# Patient Record
Sex: FEMALE | Race: ASIAN | NOT HISPANIC OR LATINO | URBAN - METROPOLITAN AREA
[De-identification: names, ages, dates, MRNs, and addresses within clinical notes are randomized per-mention and may not be internally consistent; named-entity substitution may affect disease eponyms.]

---

## 2018-03-17 ENCOUNTER — OUTPATIENT (OUTPATIENT)
Dept: OUTPATIENT SERVICES | Facility: HOSPITAL | Age: 28
LOS: 1 days | End: 2018-03-17
Payer: MEDICAID

## 2018-03-17 DIAGNOSIS — O26.899 OTHER SPECIFIED PREGNANCY RELATED CONDITIONS, UNSPECIFIED TRIMESTER: ICD-10-CM

## 2018-03-17 DIAGNOSIS — Z3A.00 WEEKS OF GESTATION OF PREGNANCY NOT SPECIFIED: ICD-10-CM

## 2018-03-17 LAB — GLUCOSE BLDC GLUCOMTR-MCNC: 137 MG/DL — HIGH (ref 70–99)

## 2018-03-17 PROCEDURE — 99282 EMERGENCY DEPT VISIT SF MDM: CPT

## 2018-03-17 PROCEDURE — 99234 HOSP IP/OBS SM DT SF/LOW 45: CPT

## 2018-03-17 PROCEDURE — G0463: CPT

## 2018-03-17 PROCEDURE — 76815 OB US LIMITED FETUS(S): CPT | Mod: 26

## 2018-03-17 PROCEDURE — 82962 GLUCOSE BLOOD TEST: CPT

## 2018-03-17 PROCEDURE — 59025 FETAL NON-STRESS TEST: CPT

## 2018-03-17 PROCEDURE — 76819 FETAL BIOPHYS PROFIL W/O NST: CPT | Mod: 26

## 2018-03-17 PROCEDURE — 76819 FETAL BIOPHYS PROFIL W/O NST: CPT

## 2018-03-17 PROCEDURE — 76815 OB US LIMITED FETUS(S): CPT

## 2018-03-17 RX ORDER — SODIUM CHLORIDE 0.65 %
1 AEROSOL, SPRAY (ML) NASAL THREE TIMES A DAY
Refills: 0 | Status: DISCONTINUED | OUTPATIENT
Start: 2018-03-17 | End: 2018-04-01

## 2018-04-15 ENCOUNTER — EMERGENCY (EMERGENCY)
Facility: HOSPITAL | Age: 28
LOS: 0 days | Discharge: HOME | End: 2018-04-15
Attending: EMERGENCY MEDICINE
Payer: COMMERCIAL

## 2018-04-15 VITALS
RESPIRATION RATE: 17 BRPM | SYSTOLIC BLOOD PRESSURE: 126 MMHG | DIASTOLIC BLOOD PRESSURE: 78 MMHG | TEMPERATURE: 99 F | HEART RATE: 74 BPM | OXYGEN SATURATION: 100 %

## 2018-04-15 VITALS
OXYGEN SATURATION: 98 % | TEMPERATURE: 98 F | SYSTOLIC BLOOD PRESSURE: 131 MMHG | DIASTOLIC BLOOD PRESSURE: 75 MMHG | WEIGHT: 203.93 LBS | HEART RATE: 79 BPM | RESPIRATION RATE: 16 BRPM | HEIGHT: 64 IN

## 2018-04-15 DIAGNOSIS — G43.009 MIGRAINE WITHOUT AURA, NOT INTRACTABLE, WITHOUT STATUS MIGRAINOSUS: ICD-10-CM

## 2018-04-15 DIAGNOSIS — R10.9 UNSPECIFIED ABDOMINAL PAIN: ICD-10-CM

## 2018-04-15 DIAGNOSIS — Z79.82 LONG TERM (CURRENT) USE OF ASPIRIN: ICD-10-CM

## 2018-04-15 LAB
ALBUMIN SERPL ELPH-MCNC: 3.3 G/DL — SIGNIFICANT CHANGE UP (ref 3.3–5)
ALP SERPL-CCNC: 109 U/L — SIGNIFICANT CHANGE UP (ref 40–120)
ALT FLD-CCNC: 63 U/L — SIGNIFICANT CHANGE UP (ref 12–78)
ANION GAP SERPL CALC-SCNC: 7 MMOL/L — SIGNIFICANT CHANGE UP (ref 5–17)
AST SERPL-CCNC: 29 U/L — SIGNIFICANT CHANGE UP (ref 15–37)
BILIRUB SERPL-MCNC: 0.2 MG/DL — SIGNIFICANT CHANGE UP (ref 0.2–1.2)
BUN SERPL-MCNC: 15 MG/DL — SIGNIFICANT CHANGE UP (ref 7–23)
CALCIUM SERPL-MCNC: 8.9 MG/DL — SIGNIFICANT CHANGE UP (ref 8.5–10.1)
CHLORIDE SERPL-SCNC: 110 MMOL/L — HIGH (ref 96–108)
CO2 SERPL-SCNC: 26 MMOL/L — SIGNIFICANT CHANGE UP (ref 22–31)
CREAT SERPL-MCNC: 0.71 MG/DL — SIGNIFICANT CHANGE UP (ref 0.5–1.3)
ETHANOL SERPL-MCNC: <10 MG/DL — SIGNIFICANT CHANGE UP (ref 0–10)
GLUCOSE SERPL-MCNC: 97 MG/DL — SIGNIFICANT CHANGE UP (ref 70–99)
HCG SERPL-ACNC: 1 MIU/ML — SIGNIFICANT CHANGE UP
HCT VFR BLD CALC: 38.6 % — SIGNIFICANT CHANGE UP (ref 34.5–45)
HGB BLD-MCNC: 11.7 G/DL — SIGNIFICANT CHANGE UP (ref 11.5–15.5)
MCHC RBC-ENTMCNC: 23.4 PG — LOW (ref 27–34)
MCHC RBC-ENTMCNC: 30.3 GM/DL — LOW (ref 32–36)
MCV RBC AUTO: 77.2 FL — LOW (ref 80–100)
NRBC # BLD: 0 /100 WBCS — SIGNIFICANT CHANGE UP (ref 0–0)
PLATELET # BLD AUTO: 238 K/UL — SIGNIFICANT CHANGE UP (ref 150–400)
POTASSIUM SERPL-MCNC: 4.1 MMOL/L — SIGNIFICANT CHANGE UP (ref 3.5–5.3)
POTASSIUM SERPL-SCNC: 4.1 MMOL/L — SIGNIFICANT CHANGE UP (ref 3.5–5.3)
PROT SERPL-MCNC: 7.6 GM/DL — SIGNIFICANT CHANGE UP (ref 6–8.3)
RBC # BLD: 5 M/UL — SIGNIFICANT CHANGE UP (ref 3.8–5.2)
RBC # FLD: 15.8 % — HIGH (ref 10.3–14.5)
SODIUM SERPL-SCNC: 143 MMOL/L — SIGNIFICANT CHANGE UP (ref 135–145)
WBC # BLD: 8.82 K/UL — SIGNIFICANT CHANGE UP (ref 3.8–10.5)
WBC # FLD AUTO: 8.82 K/UL — SIGNIFICANT CHANGE UP (ref 3.8–10.5)

## 2018-04-15 PROCEDURE — 99284 EMERGENCY DEPT VISIT MOD MDM: CPT

## 2018-04-15 PROCEDURE — 93970 EXTREMITY STUDY: CPT | Mod: 26

## 2018-04-15 PROCEDURE — 70450 CT HEAD/BRAIN W/O DYE: CPT | Mod: 26

## 2018-04-15 RX ORDER — METOCLOPRAMIDE HCL 10 MG
10 TABLET ORAL ONCE
Refills: 0 | Status: COMPLETED | OUTPATIENT
Start: 2018-04-15 | End: 2018-04-15

## 2018-04-15 RX ORDER — DIPHENHYDRAMINE HCL 50 MG
25 CAPSULE ORAL ONCE
Refills: 0 | Status: COMPLETED | OUTPATIENT
Start: 2018-04-15 | End: 2018-04-15

## 2018-04-15 RX ORDER — FAMOTIDINE 10 MG/ML
20 INJECTION INTRAVENOUS ONCE
Refills: 0 | Status: COMPLETED | OUTPATIENT
Start: 2018-04-15 | End: 2018-04-15

## 2018-04-15 RX ADMIN — FAMOTIDINE 20 MILLIGRAM(S): 10 INJECTION INTRAVENOUS at 15:50

## 2018-04-15 RX ADMIN — Medication 25 MILLIGRAM(S): at 15:50

## 2018-04-15 RX ADMIN — Medication 10 MILLIGRAM(S): at 15:50

## 2018-04-15 NOTE — ED PROVIDER NOTE - ATTENDING CONTRIBUTION TO CARE
27 y  f s/p normal vaginal delivery 3/19/2017 c/o of mild bandlike ha x 1 day. Pt didn't take anything for symptoms. Its an aching sensation. She denies trauma, blood thinner, abdominal pain, vaginal bleeding dizziness, cfm hx of cva, hest pain. this is not the worst ha of her life. Noise makes it worst. she isn't breast feeding. Pt had a normal f/u ob 4 week post partum visit. No radiation  ROS: negative for fever, cough, headache, chest pain, shortness of breath, abd pain, nausea, vomiting, diarrhea, rash, paresthesia, and weakness.   PMH: negative; Meds: Denies; SH: Denies smoking/drinking/drug use   PHYSICAL:  Vitals: WNL  Gen: AAOx3, upon entering pt. begins to have panic attack after receiving benadryl, she reports numbness in her body and inability to move her extremities acutely, pt. still speaking and complaining while experiencing symptoms, muscle tone intact throughout  Head: ncat, perrla, eomi b/l  Neck: supple, no lymphadenopathy, no midline deviation  Heart: rrr, no m/r/g  Lungs: CTA b/l, no rales/ronchi/wheezes  Abd: soft, nontender, non-distended, no rebound or guarding  Ext: no clubbing/cyanosis/edema  Neuro: sensation and muscle strength intact b/l, decreased during panic attack

## 2018-04-15 NOTE — ED PROVIDER NOTE - OBJECTIVE STATEMENT
this is a 27 y  f w no pmhx normal vaginal delivery 3/19/2017 c/o of mild bandlike ha x 1 day. Pt didn't take anything for symptoms. Its an aching sensation. She denies trauma, blood thinner, abdominal pain, vaginal bleeding dizziness, cfm hx of cva, hest pain. this is not the worst ha of her life. Noise makes it worst. she isn't breast feeding. Pt had a normal f/u ob 4 week post partum visit. No radiation

## 2018-04-15 NOTE — ED PROVIDER NOTE - PROGRESS NOTE DETAILS
Results reported to patient--grossly benign  Pt. reports feeling better after meds, denies urinary complaints; will cancel UA  pt. agrees to f/u with primary care outpt. as well as neuro within the next week  pt. understands to return to ED if symptoms worsen; will d/c

## 2018-04-15 NOTE — ED PROVIDER NOTE - MEDICAL DECISION MAKING DETAILS
26 yo F with reported headache for 1 month, no sick contacts, no photosensitivity on exam, no nuchal rigidity on exam, concerning for bleed, also migraine headache  -basic labs, hcg, ua, cx, drug screen, etoh, ct brain, us DVT LE r/o, ekg, monitor now  -will give pepcid, reglan, ns, tylenol, and reeval, ativan 1 mg iv for acute anxiety

## 2018-04-15 NOTE — ED ADULT NURSE NOTE - OBJECTIVE STATEMENT
patient c/o of headache with on and off dizziness, started 1 month ago after she gave birth by natural delivery , denied N/V , denied chest pain , patient c/o of swelling bilateral ankles since pregnancy , denied difficulty breathing , c/o of L calf pain

## 2020-01-20 ENCOUNTER — EMERGENCY (EMERGENCY)
Facility: HOSPITAL | Age: 30
LOS: 0 days | Discharge: HOME | End: 2020-01-20
Payer: COMMERCIAL

## 2020-01-20 VITALS
RESPIRATION RATE: 17 BRPM | TEMPERATURE: 98 F | HEART RATE: 96 BPM | SYSTOLIC BLOOD PRESSURE: 141 MMHG | WEIGHT: 209 LBS | DIASTOLIC BLOOD PRESSURE: 95 MMHG | HEIGHT: 64 IN | OXYGEN SATURATION: 98 %

## 2020-01-20 DIAGNOSIS — J06.9 ACUTE UPPER RESPIRATORY INFECTION, UNSPECIFIED: ICD-10-CM

## 2020-01-20 DIAGNOSIS — R06.2 WHEEZING: ICD-10-CM

## 2020-01-20 DIAGNOSIS — R05 COUGH: ICD-10-CM

## 2020-01-20 DIAGNOSIS — Z79.52 LONG TERM (CURRENT) USE OF SYSTEMIC STEROIDS: ICD-10-CM

## 2020-01-20 DIAGNOSIS — R50.9 FEVER, UNSPECIFIED: ICD-10-CM

## 2020-01-20 PROCEDURE — 99285 EMERGENCY DEPT VISIT HI MDM: CPT

## 2020-01-20 PROCEDURE — 93010 ELECTROCARDIOGRAM REPORT: CPT

## 2020-01-20 PROCEDURE — 71046 X-RAY EXAM CHEST 2 VIEWS: CPT | Mod: 26

## 2020-01-20 RX ORDER — IPRATROPIUM/ALBUTEROL SULFATE 18-103MCG
3 AEROSOL WITH ADAPTER (GRAM) INHALATION ONCE
Refills: 0 | Status: COMPLETED | OUTPATIENT
Start: 2020-01-20 | End: 2020-01-20

## 2020-01-20 RX ORDER — ALBUTEROL 90 UG/1
2 AEROSOL, METERED ORAL
Qty: 1 | Refills: 0
Start: 2020-01-20 | End: 2020-01-23

## 2020-01-20 RX ADMIN — Medication 50 MILLIGRAM(S): at 17:57

## 2020-01-20 RX ADMIN — Medication 3 MILLILITER(S): at 17:18

## 2020-01-20 NOTE — ED ADULT TRIAGE NOTE - CHIEF COMPLAINT QUOTE
cough, cold, fever  and chest pains since Thursday. Cough is productive yellowish smoker. LMP 1/18/2020

## 2020-01-20 NOTE — ED PROVIDER NOTE - OBJECTIVE STATEMENT
28 yo female with PMHx of asthma, presenting to ED with 3 days of subjective fevers, cough and +sick contacts. Patient has two children at home sick. Patient endorses taking Levaquin given to her by a family member. Denies n/v/d.

## 2020-01-20 NOTE — ED PROVIDER NOTE - PATIENT PORTAL LINK FT
You can access the FollowMyHealth Patient Portal offered by [facility] by registering at the following website: [email or web address]. By joining [facility]’s FollowMyHealth portal, you will also be able to view your health information using other applications (apps) compatible with our system.

## 2020-01-20 NOTE — ED PROVIDER NOTE - CLINICAL SUMMARY MEDICAL DECISION MAKING FREE TEXT BOX
Based on exam and history likely bronchial asthma, will obtain CXR to RO PNA, will give Prednisone and Duoneb

## 2020-01-20 NOTE — ED PROVIDER NOTE - PHYSICAL EXAMINATION
Mild expiratory wheezing left lower lobe, no retractions,   Throat is lcear no exudates, no cervical lymphadenopathy

## 2021-01-19 ENCOUNTER — INPATIENT (INPATIENT)
Facility: HOSPITAL | Age: 31
LOS: 11 days | Discharge: HOME | End: 2021-01-31
Attending: HOSPITALIST | Admitting: HOSPITALIST
Payer: MEDICAID

## 2021-01-19 VITALS
SYSTOLIC BLOOD PRESSURE: 145 MMHG | HEIGHT: 64 IN | RESPIRATION RATE: 17 BRPM | TEMPERATURE: 102 F | OXYGEN SATURATION: 98 % | HEART RATE: 118 BPM | DIASTOLIC BLOOD PRESSURE: 86 MMHG

## 2021-01-19 DIAGNOSIS — U07.1 COVID-19: ICD-10-CM

## 2021-01-19 DIAGNOSIS — R07.9 CHEST PAIN, UNSPECIFIED: ICD-10-CM

## 2021-01-19 DIAGNOSIS — R06.02 SHORTNESS OF BREATH: ICD-10-CM

## 2021-01-19 DIAGNOSIS — Z29.9 ENCOUNTER FOR PROPHYLACTIC MEASURES, UNSPECIFIED: ICD-10-CM

## 2021-01-19 DIAGNOSIS — J45.901 UNSPECIFIED ASTHMA WITH (ACUTE) EXACERBATION: ICD-10-CM

## 2021-01-19 DIAGNOSIS — Z02.9 ENCOUNTER FOR ADMINISTRATIVE EXAMINATIONS, UNSPECIFIED: ICD-10-CM

## 2021-01-19 DIAGNOSIS — A41.89 OTHER SPECIFIED SEPSIS: ICD-10-CM

## 2021-01-19 DIAGNOSIS — R74.01 ELEVATION OF LEVELS OF LIVER TRANSAMINASE LEVELS: ICD-10-CM

## 2021-01-19 LAB
ALBUMIN SERPL ELPH-MCNC: 4.5 G/DL — SIGNIFICANT CHANGE UP (ref 3.3–5)
ALP SERPL-CCNC: 70 U/L — SIGNIFICANT CHANGE UP (ref 40–120)
ALT FLD-CCNC: 53 U/L — HIGH (ref 4–33)
ANION GAP SERPL CALC-SCNC: 15 MMOL/L — HIGH (ref 7–14)
AST SERPL-CCNC: 55 U/L — HIGH (ref 4–32)
B PERT DNA SPEC QL NAA+PROBE: SIGNIFICANT CHANGE UP
BASOPHILS # BLD AUTO: 0.01 K/UL — SIGNIFICANT CHANGE UP (ref 0–0.2)
BASOPHILS NFR BLD AUTO: 0.2 % — SIGNIFICANT CHANGE UP (ref 0–2)
BILIRUB SERPL-MCNC: 0.3 MG/DL — SIGNIFICANT CHANGE UP (ref 0.2–1.2)
BUN SERPL-MCNC: 12 MG/DL — SIGNIFICANT CHANGE UP (ref 7–23)
C PNEUM DNA SPEC QL NAA+PROBE: SIGNIFICANT CHANGE UP
CALCIUM SERPL-MCNC: 9.2 MG/DL — SIGNIFICANT CHANGE UP (ref 8.4–10.5)
CHLORIDE SERPL-SCNC: 100 MMOL/L — SIGNIFICANT CHANGE UP (ref 98–107)
CK MB BLD-MCNC: <0.7 % — SIGNIFICANT CHANGE UP (ref 0–2.5)
CK MB CFR SERPL CALC: <1 NG/ML — SIGNIFICANT CHANGE UP
CK SERPL-CCNC: 142 U/L — SIGNIFICANT CHANGE UP (ref 25–170)
CO2 SERPL-SCNC: 22 MMOL/L — SIGNIFICANT CHANGE UP (ref 22–31)
CREAT SERPL-MCNC: 0.82 MG/DL — SIGNIFICANT CHANGE UP (ref 0.5–1.3)
EOSINOPHIL # BLD AUTO: 0 K/UL — SIGNIFICANT CHANGE UP (ref 0–0.5)
EOSINOPHIL NFR BLD AUTO: 0 % — SIGNIFICANT CHANGE UP (ref 0–6)
FLUAV H1 2009 PAND RNA SPEC QL NAA+PROBE: SIGNIFICANT CHANGE UP
FLUAV H1 RNA SPEC QL NAA+PROBE: SIGNIFICANT CHANGE UP
FLUAV H3 RNA SPEC QL NAA+PROBE: SIGNIFICANT CHANGE UP
FLUAV SUBTYP SPEC NAA+PROBE: SIGNIFICANT CHANGE UP
FLUBV RNA SPEC QL NAA+PROBE: SIGNIFICANT CHANGE UP
GLUCOSE SERPL-MCNC: 92 MG/DL — SIGNIFICANT CHANGE UP (ref 70–99)
HADV DNA SPEC QL NAA+PROBE: SIGNIFICANT CHANGE UP
HCOV PNL SPEC NAA+PROBE: SIGNIFICANT CHANGE UP
HCT VFR BLD CALC: 42 % — SIGNIFICANT CHANGE UP (ref 34.5–45)
HGB BLD-MCNC: 13.1 G/DL — SIGNIFICANT CHANGE UP (ref 11.5–15.5)
HMPV RNA SPEC QL NAA+PROBE: SIGNIFICANT CHANGE UP
HPIV1 RNA SPEC QL NAA+PROBE: SIGNIFICANT CHANGE UP
HPIV2 RNA SPEC QL NAA+PROBE: SIGNIFICANT CHANGE UP
HPIV3 RNA SPEC QL NAA+PROBE: SIGNIFICANT CHANGE UP
HPIV4 RNA SPEC QL NAA+PROBE: SIGNIFICANT CHANGE UP
IANC: 4.16 K/UL — SIGNIFICANT CHANGE UP (ref 1.5–8.5)
IMM GRANULOCYTES NFR BLD AUTO: 0.3 % — SIGNIFICANT CHANGE UP (ref 0–1.5)
LYMPHOCYTES # BLD AUTO: 1.67 K/UL — SIGNIFICANT CHANGE UP (ref 1–3.3)
LYMPHOCYTES # BLD AUTO: 26.6 % — SIGNIFICANT CHANGE UP (ref 13–44)
MCHC RBC-ENTMCNC: 25.3 PG — LOW (ref 27–34)
MCHC RBC-ENTMCNC: 31.2 GM/DL — LOW (ref 32–36)
MCV RBC AUTO: 81.2 FL — SIGNIFICANT CHANGE UP (ref 80–100)
MONOCYTES # BLD AUTO: 0.41 K/UL — SIGNIFICANT CHANGE UP (ref 0–0.9)
MONOCYTES NFR BLD AUTO: 6.5 % — SIGNIFICANT CHANGE UP (ref 2–14)
NEUTROPHILS # BLD AUTO: 4.16 K/UL — SIGNIFICANT CHANGE UP (ref 1.8–7.4)
NEUTROPHILS NFR BLD AUTO: 66.4 % — SIGNIFICANT CHANGE UP (ref 43–77)
NRBC # BLD: 0 /100 WBCS — SIGNIFICANT CHANGE UP
NRBC # FLD: 0 K/UL — SIGNIFICANT CHANGE UP
PLATELET # BLD AUTO: 167 K/UL — SIGNIFICANT CHANGE UP (ref 150–400)
POTASSIUM SERPL-MCNC: 4 MMOL/L — SIGNIFICANT CHANGE UP (ref 3.5–5.3)
POTASSIUM SERPL-SCNC: 4 MMOL/L — SIGNIFICANT CHANGE UP (ref 3.5–5.3)
PROT SERPL-MCNC: 8.5 G/DL — HIGH (ref 6–8.3)
RAPID RVP RESULT: DETECTED
RBC # BLD: 5.17 M/UL — SIGNIFICANT CHANGE UP (ref 3.8–5.2)
RBC # FLD: 14.4 % — SIGNIFICANT CHANGE UP (ref 10.3–14.5)
RV+EV RNA SPEC QL NAA+PROBE: SIGNIFICANT CHANGE UP
SARS-COV-2 RNA SPEC QL NAA+PROBE: DETECTED
SODIUM SERPL-SCNC: 137 MMOL/L — SIGNIFICANT CHANGE UP (ref 135–145)
TROPONIN T, HIGH SENSITIVITY RESULT: <6 NG/L — SIGNIFICANT CHANGE UP
WBC # BLD: 6.27 K/UL — SIGNIFICANT CHANGE UP (ref 3.8–10.5)
WBC # FLD AUTO: 6.27 K/UL — SIGNIFICANT CHANGE UP (ref 3.8–10.5)

## 2021-01-19 PROCEDURE — 99285 EMERGENCY DEPT VISIT HI MDM: CPT

## 2021-01-19 PROCEDURE — 99223 1ST HOSP IP/OBS HIGH 75: CPT | Mod: GC

## 2021-01-19 PROCEDURE — 71045 X-RAY EXAM CHEST 1 VIEW: CPT | Mod: 26

## 2021-01-19 RX ORDER — ENOXAPARIN SODIUM 100 MG/ML
40 INJECTION SUBCUTANEOUS EVERY 12 HOURS
Refills: 0 | Status: DISCONTINUED | OUTPATIENT
Start: 2021-01-19 | End: 2021-01-31

## 2021-01-19 RX ORDER — SODIUM CHLORIDE 9 MG/ML
1000 INJECTION INTRAMUSCULAR; INTRAVENOUS; SUBCUTANEOUS ONCE
Refills: 0 | Status: COMPLETED | OUTPATIENT
Start: 2021-01-19 | End: 2021-01-19

## 2021-01-19 RX ORDER — ALBUTEROL 90 UG/1
2 AEROSOL, METERED ORAL ONCE
Refills: 0 | Status: COMPLETED | OUTPATIENT
Start: 2021-01-19 | End: 2021-01-19

## 2021-01-19 RX ORDER — ACETAMINOPHEN 500 MG
650 TABLET ORAL ONCE
Refills: 0 | Status: COMPLETED | OUTPATIENT
Start: 2021-01-19 | End: 2021-01-19

## 2021-01-19 RX ORDER — BUDESONIDE AND FORMOTEROL FUMARATE DIHYDRATE 160; 4.5 UG/1; UG/1
2 AEROSOL RESPIRATORY (INHALATION)
Refills: 0 | Status: DISCONTINUED | OUTPATIENT
Start: 2021-01-19 | End: 2021-01-26

## 2021-01-19 RX ORDER — ALBUTEROL 90 UG/1
0 AEROSOL, METERED ORAL
Qty: 0 | Refills: 0 | DISCHARGE

## 2021-01-19 RX ORDER — ACETAMINOPHEN 500 MG
650 TABLET ORAL EVERY 6 HOURS
Refills: 0 | Status: DISCONTINUED | OUTPATIENT
Start: 2021-01-19 | End: 2021-01-29

## 2021-01-19 RX ORDER — ALBUTEROL 90 UG/1
2 AEROSOL, METERED ORAL EVERY 4 HOURS
Refills: 0 | Status: DISCONTINUED | OUTPATIENT
Start: 2021-01-19 | End: 2021-01-26

## 2021-01-19 RX ORDER — IBUPROFEN 200 MG
600 TABLET ORAL ONCE
Refills: 0 | Status: COMPLETED | OUTPATIENT
Start: 2021-01-19 | End: 2021-01-19

## 2021-01-19 RX ADMIN — Medication 125 MILLIGRAM(S): at 17:51

## 2021-01-19 RX ADMIN — ALBUTEROL 2 PUFF(S): 90 AEROSOL, METERED ORAL at 17:51

## 2021-01-19 RX ADMIN — Medication 650 MILLIGRAM(S): at 17:03

## 2021-01-19 RX ADMIN — SODIUM CHLORIDE 1000 MILLILITER(S): 9 INJECTION INTRAMUSCULAR; INTRAVENOUS; SUBCUTANEOUS at 17:03

## 2021-01-19 RX ADMIN — Medication 600 MILLIGRAM(S): at 18:54

## 2021-01-19 NOTE — H&P ADULT - PROBLEM SELECTOR PLAN 1
- COVID Detected , virals sepsis in setting of Covid19   - HR >100 , RR >20 , Febrile 101.9 Oral   - s/p Tylenol  - resolved  - EKG: Sinus Tachycardia@104 qtc 447  - Vitals q4  - monitor

## 2021-01-19 NOTE — H&P ADULT - HISTORY OF PRESENT ILLNESS
30 y/ Female with PMHx of Asthma( on Symbicort) presents to [facility] from  for shortness of breath, cough , wheezing , chest pain ,Fever, chills, and Body aches. Pt was refereed to [facility] from , pt was diagnosed with COVID-19, Pt satting 96 on RA, Pt noted to have audible wheezes at time of the arrival in Ed. Pt states that she has been having those symptoms past few days but today she went to the urgent care to seek medication attention. Pt endorses Chest Pain, Pt describes the chest pain, midsternal, radiating to the back, TTP palpation , 10/10 pressure like, worsens when laying flat and cough, chest pain improves with leaning forward / seating up. Pt was exposed to Covid with family, both of her mother and father were diagnosed with COVID19. Pt denies, PND, , palpitations, diaphoresis, lightheadedness, dizziness, syncope, increased lowr extremity edema, generalized fatigue abdominal pain, N/V/C/D BRBPR, melena, urinary symptoms

## 2021-01-19 NOTE — H&P ADULT - NSHPSOCIALHISTORY_GEN_ALL_CORE
Tobacco Usage:  (x ) never smoked   ( ) former smoker  ( ) current smoker; Packs per day:   Alcohol Usage: (x ) none  ( ) occasional ( ) 2-3 times a week ( ) daily; Last drink:   Recreational drugs (x ) None  Lives with family  Denies Recent travel

## 2021-01-19 NOTE — H&P ADULT - NSHPLABSRESULTS_GEN_ALL_CORE
Labs:                        13.1   6.27  )-----------( 167      ( 19 Jan 2021 17:05 )             42.0     01-19    137  |  100  |  12  ----------------------------<  92  4.0   |  22  |  0.82    Ca    9.2      19 Jan 2021 17:05    TPro  8.5<H>  /  Alb  4.5  /  TBili  0.3  /  DBili  x   /  AST  55<H>  /  ALT  53<H>  /  AlkPhos  70  01-19      COVID-19/Full RVP Panel:    01-19-21 @ 19:34  Adenovirus: NotDetec  CHlamydia pneumoniae: NotDetec  Coronavirus (229E, KHU1, NL63, OC43): NotDetec  Entero/Rhinovirus: NotDetec  hMPV: NotDetec  Influenza A: NotDetec  Influenza AH1: NotDetec  Influenza AH1 2009: NotDetec  Influenza AH3: NotDetec  Influenza B: NotDetec  Mycoplasma pneumoniae: NotDetec  Parainfluenza 1: NotDetec  Parainfluenza 2: NotDetec  Parainfluenza 3: NotDetec  Parainfluenza 4: NotDetec  Rapid RVP Results: Detected  Resp Syncytial Virus: --  SARS-CoV-2: Detected    EKG: Sinus Tachycardia@104 qtc 447    Chest Xray: IMPRESSION: Bilateral peripheral opacities consistent with covid 19 multifocal pneumonia.

## 2021-01-19 NOTE — H&P ADULT - ASSESSMENT
30 y/ Female with PMHx of Asthma( on Symbicort) presents to [facility] from  for shortness of breath, cough , wheezing , chest pain ,Fever, chills, and Body aches. Pt was refereed to [facility] from , pt was diagnosed with COVID-19, Pt is being admitted for asthma exacerbation 2/2 COVID19 and Chest Pain.

## 2021-01-19 NOTE — ED PROVIDER NOTE - CLINICAL SUMMARY MEDICAL DECISION MAKING FREE TEXT BOX
29 y/o female with hx of asthma presents to ED c/o fever, chills and SOB and tested positive for COVID today. Plan for basic labs, chest x ray, fluids and likely discharge with symptomatic care if she is not hypoxic.

## 2021-01-19 NOTE — ED ADULT NURSE REASSESSMENT NOTE - NS ED NURSE REASSESS COMMENT FT1
pt c/o chest pain and shortness of breath on exertion. Ambulatory sat noted to be 87% on room air. On room air pt O2 sat 96-97% while resting. Awaiting UCG results at this time. Will continue to monitor.

## 2021-01-19 NOTE — H&P ADULT - PROBLEM SELECTOR PLAN 3
- COVID-19 viral syndrome DETECTED 1/19/21   - SPO2 96% on RA, monitor   - Chest Xray: IMPRESSION: Bilateral peripheral opacities consistent with covid 19 multifocal pneumonia.  - Ferratin , LDH , CRP, Procalcitonin Ordered  - PRN O2 NC, may use NRB  - PRN Tylenol for fever > 100.4   - Contact, airborne, droplet isolation precautions  - Monitor respiratory status closely  - Patient is at a moderate risk of decompensation at this time.  - Pt is currently on RA 96% , Will Hold decadron and Remdesivir 2/2 to protocol   - Pt was explained if she starts requiring supplemental oxygenation, we will start Remdesivir  and Decadron, Pt unsure at this time if she wants to take Remdesivir. Please discuss with patient  prior to starting Remdesivir - COVID-19 viral syndrome DETECTED 1/19/21   - SPO2 96% on RA, monitor   - Chest Xray: IMPRESSION: Bilateral peripheral opacities consistent with covid 19 multifocal pneumonia.  - Ferratin , LDH , CRP, Procalcitonin Ordered  - PRN O2 NC, may use NRB  - PRN Tylenol for fever > 100.4   - Contact, airborne, droplet isolation precautions  - Monitor respiratory status closely  - Patient is at a moderate risk of decompensation at this time.  - Pt is currently on RA 96% , Will Hold decadron and Remdesivir 2/2 to protocol   - Pt was explained if she starts requiring supplemental oxygenation, we will start Remdesivir  and Decadron, Pt unsure at this time if she wants to take Remdesivir. Please discuss with patient  prior to starting Remdesivir  -will put on prednisone for asthma exacerbation

## 2021-01-19 NOTE — ED PROVIDER NOTE - OBJECTIVE STATEMENT
29 y/o female with hx of asthma presents to ED c/o fever, chills and SOB. Pt tested positive for COVID today but has been having symptoms for last three days. Pt has taken Tylenol for symptoms and has monitored oxygen saturation at home, which has not dropped below 97%.

## 2021-01-19 NOTE — ED ADULT NURSE NOTE - OBJECTIVE STATEMENT
Receive pt in Intake room 1 alert and oriented x 4, presenting to the ER with complaints of being covid 19 positive, cough, and shortness of breath. Pt. stated " my whole family in the house have covid and I tested positive today, I have shortness of breath when I move around and I have a cough". Medicated as ordered, no c/o pain no respiratory distress. Pulse oximetry in place, will continue to monitor.

## 2021-01-19 NOTE — H&P ADULT - PROBLEM SELECTOR PLAN 4
- Stat trops sent  - trend cardiac enzymes   - pain control PRN w/ Tylenol  - TTE AM  - EKG: Sinus Tachycardia@104 qtc 447  - Chest Xray: IMPRESSION: Bilateral peripheral opacities consistent with covid 19 multifocal pneumonia. - Stat trops sent, likely from covid, asthma exacerbation  - trend cardiac enzymes   - pain control PRN w/ Tylenol  - TTE AM  - EKG: Sinus Tachycardia@104 qtc 447  - Chest Xray: IMPRESSION: Bilateral peripheral opacities consistent with covid 19 multifocal pneumonia.

## 2021-01-19 NOTE — H&P ADULT - PROBLEM SELECTOR PLAN 2
- acute on chronic asthma exacerbation likely 2/2 COVID19 Infection   - Wheezing + Shortness of breath + Cough  - s/p 125mg Solumedrol   - continue home inhalers  - Albuterol PRN  - O2 PRN - acute on chronic asthma exacerbation likely 2/2 COVID19 Infection   - Wheezing + Shortness of breath + Cough  - s/p 125mg Solumedrol, start prednisone 40mg daily for 5 days   - continue home inhalers  - Albuterol PRN  - O2 PRN

## 2021-01-19 NOTE — ED ADULT TRIAGE NOTE - CHIEF COMPLAINT QUOTE
Pt. diagnosed COVID+ and pneumonia today, sent from urgent care. Pt. states she feels SOB and chest discomfort when coughing.

## 2021-01-19 NOTE — H&P ADULT - NSHPREVIEWOFSYSTEMS_GEN_ALL_CORE
Constitutional Symptoms: + Fever + Chills + Weakness + body aches  Eyes: No visual changes, headache, eye pain, double vision  Ears, Nose, Mouth, Throat: No runny nose, sinus pain, ear pain, tinnitus, sore throat, dysphagia, odynophagia  Cardiovascular: + Chest pain No palpitations, edema  Respiratory: + Cough + Wheezing + ORTEGA + Shortness of breat No hemoptysis,  Gastrointestinal: No abdominal pain, dysphagia, anorexia, nausea/vomiting, diarrhea/constipation, hematemesis, BRBPR, melena  Genitourinary: No dysuria, frequency, hematuria  Musculoskeletal: No joint pain, joint swelling, decreased ROM  Skin: No pruritus, rashes, lesions, wounds  Neurologic:  No seizures, headache, paraesthesia, numbness, limb weakness    Positives and pertinent negatives noted and all other systems negative. Constitutional Symptoms: + Fever + Chills + Weakness + body aches  Eyes: No visual changes, headache, eye pain, double vision  Ears, Nose, Mouth, Throat: No runny nose, sinus pain, ear pain, tinnitus, sore throat, dysphagia, odynophagia  Cardiovascular: + Chest pain No palpitations, edema  Respiratory: + Cough + Wheezing + ORTEGA + Shortness of breat No hemoptysis,  Gastrointestinal: No abdominal pain, dysphagia, anorexia, nausea/vomiting, diarrhea/constipation, hematemesis, BRBPR, melena  Genitourinary: No dysuria, frequency, hematuria  Musculoskeletal: No joint pain, joint swelling, decreased ROM  Skin: No pruritus, rashes, lesions, wounds  Neurologic:  No seizures, headache, paraesthesia, numbness, limb weakness

## 2021-01-19 NOTE — ED PROVIDER NOTE - ATTENDING CONTRIBUTION TO CARE
Attending note:   After face to face evaluation of this patient, I concur with above noted hx, pe, and care plan for this patient.  Barnett: 50 yof with hx of asthma complaining of 3 days of SOB and cough and fever and chills. Pt is taking OTC meds with little relief. Pt went to urgent care and was told of pna. Pt appears to have mild resp distress, +wheeze, poor air entry, tachy, febrile, abd soft and non tender, no edema, msk and skin exam unremarkable. Pt likely has covid, which is causing worsening of his asthma, nebs and steroids indicated, possible antibiotics. fluids, labs.

## 2021-01-19 NOTE — H&P ADULT - NSHPPHYSICALEXAM_GEN_ALL_CORE
T(C): 37.2 (01-19-21 @ 20:54), Max: 39.1 (01-19-21 @ 14:03)  HR: 87 (01-19-21 @ 20:54) (87 - 118)  BP: 101/59 (01-19-21 @ 20:54) (101/59 - 145/86)  RR: 22 (01-19-21 @ 20:54) (16 - 25)  SpO2: 96% (01-19-21 @ 20:54) (87% - 98%)    Constitutional: NAD, well-developed, well-nourished  Ears, Nose, Mouth, and Throat: normal external ears and nose, normal hearing, moist oral mucosa  Eyes: normal conjunctiva, EOMI, PERRL  Neck: supple, no JVD  Respiratory: mild wheezes to b/l bases. No rales or rhonchi. Normal respiratory effort, speaking full sentences   Cardiovascular: RRR, no M/R/G, no edema, 2+ Peripheral Pulses  Gastrointestinal: soft, nontender, nondistended, +BS, no hernia  Skin: warm, dry, no rash  Neurologic: sensation grossly intact, CN grossly intact, non-focal exam  Musculoskeletal: no clubbing, no cyanosis, no joint swelling  Psychiatric: AOX3, appropriate mood, affect

## 2021-01-20 LAB
ALBUMIN SERPL ELPH-MCNC: 3.9 G/DL — SIGNIFICANT CHANGE UP (ref 3.3–5)
ALP SERPL-CCNC: 63 U/L — SIGNIFICANT CHANGE UP (ref 40–120)
ALT FLD-CCNC: 52 U/L — HIGH (ref 4–33)
ANION GAP SERPL CALC-SCNC: 14 MMOL/L — SIGNIFICANT CHANGE UP (ref 7–14)
APTT BLD: 25.3 SEC — LOW (ref 27–36.3)
AST SERPL-CCNC: 45 U/L — HIGH (ref 4–32)
BILIRUB SERPL-MCNC: 0.3 MG/DL — SIGNIFICANT CHANGE UP (ref 0.2–1.2)
BUN SERPL-MCNC: 10 MG/DL — SIGNIFICANT CHANGE UP (ref 7–23)
CALCIUM SERPL-MCNC: 8.9 MG/DL — SIGNIFICANT CHANGE UP (ref 8.4–10.5)
CHLORIDE SERPL-SCNC: 103 MMOL/L — SIGNIFICANT CHANGE UP (ref 98–107)
CK SERPL-CCNC: 206 U/L — HIGH (ref 25–170)
CO2 SERPL-SCNC: 20 MMOL/L — LOW (ref 22–31)
CREAT SERPL-MCNC: 0.59 MG/DL — SIGNIFICANT CHANGE UP (ref 0.5–1.3)
CRP SERPL-MCNC: 79.4 MG/L — HIGH
D DIMER BLD IA.RAPID-MCNC: 210 NG/ML DDU — HIGH
FERRITIN SERPL-MCNC: 373 NG/ML — HIGH (ref 15–150)
GLUCOSE SERPL-MCNC: 184 MG/DL — HIGH (ref 70–99)
HCT VFR BLD CALC: 40.4 % — SIGNIFICANT CHANGE UP (ref 34.5–45)
HGB BLD-MCNC: 12.3 G/DL — SIGNIFICANT CHANGE UP (ref 11.5–15.5)
INR BLD: 1.15 RATIO — SIGNIFICANT CHANGE UP (ref 0.88–1.16)
LACTATE SERPL-SCNC: 1.4 MMOL/L — SIGNIFICANT CHANGE UP (ref 0.5–2)
LDH SERPL L TO P-CCNC: 418 U/L — HIGH (ref 135–225)
MAGNESIUM SERPL-MCNC: 2.1 MG/DL — SIGNIFICANT CHANGE UP (ref 1.6–2.6)
MCHC RBC-ENTMCNC: 24.6 PG — LOW (ref 27–34)
MCHC RBC-ENTMCNC: 30.4 GM/DL — LOW (ref 32–36)
MCV RBC AUTO: 80.8 FL — SIGNIFICANT CHANGE UP (ref 80–100)
NRBC # BLD: 0 /100 WBCS — SIGNIFICANT CHANGE UP
NRBC # FLD: 0 K/UL — SIGNIFICANT CHANGE UP
PHOSPHATE SERPL-MCNC: 3.1 MG/DL — SIGNIFICANT CHANGE UP (ref 2.5–4.5)
PLATELET # BLD AUTO: 163 K/UL — SIGNIFICANT CHANGE UP (ref 150–400)
POTASSIUM SERPL-MCNC: 4.5 MMOL/L — SIGNIFICANT CHANGE UP (ref 3.5–5.3)
POTASSIUM SERPL-SCNC: 4.5 MMOL/L — SIGNIFICANT CHANGE UP (ref 3.5–5.3)
PROCALCITONIN SERPL-MCNC: 0.31 NG/ML — HIGH (ref 0.02–0.1)
PROT SERPL-MCNC: 8.1 G/DL — SIGNIFICANT CHANGE UP (ref 6–8.3)
PROTHROM AB SERPL-ACNC: 13.1 SEC — SIGNIFICANT CHANGE UP (ref 10.6–13.6)
RBC # BLD: 5 M/UL — SIGNIFICANT CHANGE UP (ref 3.8–5.2)
RBC # FLD: 14.5 % — SIGNIFICANT CHANGE UP (ref 10.3–14.5)
SODIUM SERPL-SCNC: 137 MMOL/L — SIGNIFICANT CHANGE UP (ref 135–145)
TROPONIN T, HIGH SENSITIVITY RESULT: <6 NG/L — SIGNIFICANT CHANGE UP
WBC # BLD: 5.5 K/UL — SIGNIFICANT CHANGE UP (ref 3.8–10.5)
WBC # FLD AUTO: 5.5 K/UL — SIGNIFICANT CHANGE UP (ref 3.8–10.5)

## 2021-01-20 PROCEDURE — 99233 SBSQ HOSP IP/OBS HIGH 50: CPT

## 2021-01-20 RX ORDER — PANTOPRAZOLE SODIUM 20 MG/1
40 TABLET, DELAYED RELEASE ORAL
Refills: 0 | Status: COMPLETED | OUTPATIENT
Start: 2021-01-20 | End: 2021-01-25

## 2021-01-20 RX ADMIN — Medication 650 MILLIGRAM(S): at 14:10

## 2021-01-20 RX ADMIN — Medication 40 MILLIGRAM(S): at 05:41

## 2021-01-20 RX ADMIN — ENOXAPARIN SODIUM 40 MILLIGRAM(S): 100 INJECTION SUBCUTANEOUS at 17:43

## 2021-01-20 RX ADMIN — PANTOPRAZOLE SODIUM 40 MILLIGRAM(S): 20 TABLET, DELAYED RELEASE ORAL at 14:10

## 2021-01-20 RX ADMIN — ALBUTEROL 2 PUFF(S): 90 AEROSOL, METERED ORAL at 17:49

## 2021-01-20 RX ADMIN — BUDESONIDE AND FORMOTEROL FUMARATE DIHYDRATE 2 PUFF(S): 160; 4.5 AEROSOL RESPIRATORY (INHALATION) at 21:11

## 2021-01-20 RX ADMIN — BUDESONIDE AND FORMOTEROL FUMARATE DIHYDRATE 2 PUFF(S): 160; 4.5 AEROSOL RESPIRATORY (INHALATION) at 17:50

## 2021-01-20 RX ADMIN — ALBUTEROL 2 PUFF(S): 90 AEROSOL, METERED ORAL at 20:15

## 2021-01-20 NOTE — PROGRESS NOTE ADULT - PROBLEM SELECTOR PLAN 4
-  likely from covid, asthma exacerbation  - trend cardiac enzymes   - pain control PRN w/ Tylenol  - TTE AM  - EKG: Sinus Tachycardia@104 qtc 447  - Chest Xray: IMPRESSION: Bilateral peripheral opacities consistent with covid 19 multifocal pneumonia. -  likely from covid/asthma exacerbation  - trop<6  - pain control PRN w/ Tylenol  - f/u TTE  - EKG: Sinus Tachycardia@104 qtc 447  - Chest Xray: IMPRESSION: Bilateral peripheral opacities consistent with covid 19 multifocal pneumonia.

## 2021-01-20 NOTE — ED ADULT NURSE REASSESSMENT NOTE - NS ED NURSE REASSESS COMMENT FT1
pt resting comfortably, resps even and unlabored. Spo2 95% on RA. pt in NAD. pt denies any acute complaints. pt transported to floor at this time.

## 2021-01-20 NOTE — PROGRESS NOTE ADULT - SUBJECTIVE AND OBJECTIVE BOX
Patient is a 30y old  Female who presents with a chief complaint of shortness of breath (19 Jan 2021 22:40)      SUBJECTIVE / OVERNIGHT EVENTS: Pt seen and examined at 12:05pm, no overnight events, pt reports cough and heart burn, denies any sob, Sats ok on RA. Asking her phone to be charged. Says that she does not want the new medicine for Covid. No other new issues reported.    MEDICATIONS  (STANDING):  budesonide  80 MICROgram(s)/formoterol 4.5 MICROgram(s) Inhaler 2 Puff(s) Inhalation two times a day  enoxaparin Injectable 40 milliGRAM(s) SubCutaneous every 12 hours  pantoprazole    Tablet 40 milliGRAM(s) Oral before breakfast  predniSONE   Tablet 40 milliGRAM(s) Oral daily    MEDICATIONS  (PRN):  acetaminophen   Tablet .. 650 milliGRAM(s) Oral every 6 hours PRN Temp greater or equal to 38C (100.4F)  ALBUTerol    90 MICROgram(s) HFA Inhaler 2 Puff(s) Inhalation every 4 hours PRN Shortness of Breath and/or Wheezing  benzonatate 100 milliGRAM(s) Oral three times a day PRN Cough      Vital Signs Last 24 Hrs  T(C): 37.3 (20 Jan 2021 13:41), Max: 38.8 (19 Jan 2021 18:35)  T(F): 99.1 (20 Jan 2021 13:41), Max: 101.9 (19 Jan 2021 18:35)  HR: 100 (20 Jan 2021 13:41) (76 - 106)  BP: 117/67 (20 Jan 2021 13:41) (101/59 - 132/81)  BP(mean): --  RR: 20 (20 Jan 2021 13:41) (16 - 25)  SpO2: 97% (20 Jan 2021 13:41) (87% - 97%)  CAPILLARY BLOOD GLUCOSE        I&O's Summary      PHYSICAL EXAM:  GENERAL: NAD, Obese femal  CHEST/LUNG: Decreased bs bilaterally; No wheeze  HEART: Regular rate and rhythm; No murmurs  ABDOMEN: Soft, Nontender, Nondistended  EXTREMITIES: no LE edema  PSYCH: Calm  NEUROLOGY: AAOx3  SKIN: No rashes or lesions    LABS:                        12.3   5.50  )-----------( 163      ( 20 Jan 2021 07:20 )             40.4     01-20    137  |  103  |  10  ----------------------------<  184<H>  4.5   |  20<L>  |  0.59    Ca    8.9      20 Jan 2021 07:20  Phos  3.1     01-20  Mg     2.1     01-20    TPro  8.1  /  Alb  3.9  /  TBili  0.3  /  DBili  x   /  AST  45<H>  /  ALT  52<H>  /  AlkPhos  63  01-20    PT/INR - ( 20 Jan 2021 07:20 )   PT: 13.1 sec;   INR: 1.15 ratio         PTT - ( 20 Jan 2021 07:20 )  PTT:25.3 sec  CARDIAC MARKERS ( 20 Jan 2021 07:20 )  x     / x     / 206 U/L / x     / x      CARDIAC MARKERS ( 19 Jan 2021 17:36 )  x     / x     / 142 U/L / x     / <1.0 ng/mL          RADIOLOGY & ADDITIONAL TESTS:    Imaging Personally Reviewed:    Consultant(s) Notes Reviewed:      Care Discussed with Consultants/Other Providers:

## 2021-01-20 NOTE — PROGRESS NOTE ADULT - PROBLEM SELECTOR PLAN 1
- COVID Detected , virals sepsis in setting of Covid19   - EKG: Sinus Tachycardia@104 qtc 447  - Vitals q4  - monitor respiratory status closely, sat ok on RA for now - COVID Detected , virals sepsis in setting of Covid19   - EKG: Sinus Tachycardia@104 qtc 447  - Vitals q4  - monitor respiratory status closely, sat ok on RA (97%) for now

## 2021-01-20 NOTE — PROGRESS NOTE ADULT - PROBLEM SELECTOR PLAN 2
- acute on chronic asthma exacerbation likely 2/2 COVID19 Infection   - Wheezing + Shortness of breath + Cough  - s/p 125mg Solumedrol, on prednisone 40mg daily for 5 days   - continue home inhalers  - Albuterol PRN  - O2 PRN

## 2021-01-21 DIAGNOSIS — E87.8 OTHER DISORDERS OF ELECTROLYTE AND FLUID BALANCE, NOT ELSEWHERE CLASSIFIED: ICD-10-CM

## 2021-01-21 LAB
ALBUMIN SERPL ELPH-MCNC: 3.5 G/DL — SIGNIFICANT CHANGE UP (ref 3.3–5)
ALBUMIN SERPL ELPH-MCNC: 3.5 G/DL — SIGNIFICANT CHANGE UP (ref 3.3–5)
ALP SERPL-CCNC: 56 U/L — SIGNIFICANT CHANGE UP (ref 40–120)
ALP SERPL-CCNC: 59 U/L — SIGNIFICANT CHANGE UP (ref 40–120)
ALT FLD-CCNC: 79 U/L — HIGH (ref 4–33)
ALT FLD-CCNC: 81 U/L — HIGH (ref 4–33)
ANION GAP SERPL CALC-SCNC: 14 MMOL/L — SIGNIFICANT CHANGE UP (ref 7–14)
AST SERPL-CCNC: 59 U/L — HIGH (ref 4–32)
AST SERPL-CCNC: 89 U/L — HIGH (ref 4–32)
BASOPHILS # BLD AUTO: 0.02 K/UL — SIGNIFICANT CHANGE UP (ref 0–0.2)
BASOPHILS NFR BLD AUTO: 0.2 % — SIGNIFICANT CHANGE UP (ref 0–2)
BILIRUB DIRECT SERPL-MCNC: <0.2 MG/DL — SIGNIFICANT CHANGE UP (ref 0–0.2)
BILIRUB INDIRECT FLD-MCNC: >0.1 MG/DL — SIGNIFICANT CHANGE UP (ref 0–1)
BILIRUB SERPL-MCNC: 0.3 MG/DL — SIGNIFICANT CHANGE UP (ref 0.2–1.2)
BILIRUB SERPL-MCNC: 0.3 MG/DL — SIGNIFICANT CHANGE UP (ref 0.2–1.2)
BUN SERPL-MCNC: 13 MG/DL — SIGNIFICANT CHANGE UP (ref 7–23)
CALCIUM SERPL-MCNC: 8.5 MG/DL — SIGNIFICANT CHANGE UP (ref 8.4–10.5)
CHLORIDE SERPL-SCNC: 101 MMOL/L — SIGNIFICANT CHANGE UP (ref 98–107)
CO2 SERPL-SCNC: 20 MMOL/L — LOW (ref 22–31)
CREAT SERPL-MCNC: 0.59 MG/DL — SIGNIFICANT CHANGE UP (ref 0.5–1.3)
CREAT SERPL-MCNC: 0.78 MG/DL — SIGNIFICANT CHANGE UP (ref 0.5–1.3)
CRP SERPL-MCNC: 49.8 MG/L — HIGH
EOSINOPHIL # BLD AUTO: 0 K/UL — SIGNIFICANT CHANGE UP (ref 0–0.5)
EOSINOPHIL NFR BLD AUTO: 0 % — SIGNIFICANT CHANGE UP (ref 0–6)
FERRITIN SERPL-MCNC: 521 NG/ML — HIGH (ref 15–150)
GLUCOSE SERPL-MCNC: 129 MG/DL — HIGH (ref 70–99)
HCT VFR BLD CALC: 39.3 % — SIGNIFICANT CHANGE UP (ref 34.5–45)
HGB BLD-MCNC: 11.9 G/DL — SIGNIFICANT CHANGE UP (ref 11.5–15.5)
IANC: 6.33 K/UL — SIGNIFICANT CHANGE UP (ref 1.5–8.5)
IMM GRANULOCYTES NFR BLD AUTO: 0.5 % — SIGNIFICANT CHANGE UP (ref 0–1.5)
LDH SERPL L TO P-CCNC: 494 U/L — HIGH (ref 135–225)
LYMPHOCYTES # BLD AUTO: 1.26 K/UL — SIGNIFICANT CHANGE UP (ref 1–3.3)
LYMPHOCYTES # BLD AUTO: 15.5 % — SIGNIFICANT CHANGE UP (ref 13–44)
MCHC RBC-ENTMCNC: 24.6 PG — LOW (ref 27–34)
MCHC RBC-ENTMCNC: 30.3 GM/DL — LOW (ref 32–36)
MCV RBC AUTO: 81.2 FL — SIGNIFICANT CHANGE UP (ref 80–100)
MONOCYTES # BLD AUTO: 0.5 K/UL — SIGNIFICANT CHANGE UP (ref 0–0.9)
MONOCYTES NFR BLD AUTO: 6.1 % — SIGNIFICANT CHANGE UP (ref 2–14)
NEUTROPHILS # BLD AUTO: 6.33 K/UL — SIGNIFICANT CHANGE UP (ref 1.8–7.4)
NEUTROPHILS NFR BLD AUTO: 77.7 % — HIGH (ref 43–77)
NRBC # BLD: 0 /100 WBCS — SIGNIFICANT CHANGE UP
NRBC # FLD: 0 K/UL — SIGNIFICANT CHANGE UP
PLATELET # BLD AUTO: 185 K/UL — SIGNIFICANT CHANGE UP (ref 150–400)
POTASSIUM SERPL-MCNC: 3.4 MMOL/L — LOW (ref 3.5–5.3)
POTASSIUM SERPL-SCNC: 3.4 MMOL/L — LOW (ref 3.5–5.3)
PROCALCITONIN SERPL-MCNC: 0.44 NG/ML — HIGH (ref 0.02–0.1)
PROT SERPL-MCNC: 7 G/DL — SIGNIFICANT CHANGE UP (ref 6–8.3)
PROT SERPL-MCNC: 7.1 G/DL — SIGNIFICANT CHANGE UP (ref 6–8.3)
RBC # BLD: 4.84 M/UL — SIGNIFICANT CHANGE UP (ref 3.8–5.2)
RBC # FLD: 14.5 % — SIGNIFICANT CHANGE UP (ref 10.3–14.5)
SODIUM SERPL-SCNC: 135 MMOL/L — SIGNIFICANT CHANGE UP (ref 135–145)
WBC # BLD: 8.15 K/UL — SIGNIFICANT CHANGE UP (ref 3.8–10.5)
WBC # FLD AUTO: 8.15 K/UL — SIGNIFICANT CHANGE UP (ref 3.8–10.5)

## 2021-01-21 PROCEDURE — 99233 SBSQ HOSP IP/OBS HIGH 50: CPT

## 2021-01-21 RX ORDER — REMDESIVIR 5 MG/ML
INJECTION INTRAVENOUS
Refills: 0 | Status: COMPLETED | OUTPATIENT
Start: 2021-01-21 | End: 2021-01-25

## 2021-01-21 RX ORDER — REMDESIVIR 5 MG/ML
100 INJECTION INTRAVENOUS EVERY 24 HOURS
Refills: 0 | Status: COMPLETED | OUTPATIENT
Start: 2021-01-22 | End: 2021-01-25

## 2021-01-21 RX ORDER — REMDESIVIR 5 MG/ML
200 INJECTION INTRAVENOUS EVERY 24 HOURS
Refills: 0 | Status: DISCONTINUED | OUTPATIENT
Start: 2021-01-21 | End: 2021-01-21

## 2021-01-21 RX ORDER — POTASSIUM CHLORIDE 20 MEQ
40 PACKET (EA) ORAL ONCE
Refills: 0 | Status: COMPLETED | OUTPATIENT
Start: 2021-01-21 | End: 2021-01-21

## 2021-01-21 RX ORDER — DEXAMETHASONE 0.5 MG/5ML
6 ELIXIR ORAL DAILY
Refills: 0 | Status: DISCONTINUED | OUTPATIENT
Start: 2021-01-21 | End: 2021-01-26

## 2021-01-21 RX ORDER — REMDESIVIR 5 MG/ML
INJECTION INTRAVENOUS
Refills: 0 | Status: DISCONTINUED | OUTPATIENT
Start: 2021-01-21 | End: 2021-01-21

## 2021-01-21 RX ORDER — REMDESIVIR 5 MG/ML
200 INJECTION INTRAVENOUS EVERY 24 HOURS
Refills: 0 | Status: COMPLETED | OUTPATIENT
Start: 2021-01-21 | End: 2021-01-21

## 2021-01-21 RX ADMIN — REMDESIVIR 500 MILLIGRAM(S): 5 INJECTION INTRAVENOUS at 15:07

## 2021-01-21 RX ADMIN — ALBUTEROL 2 PUFF(S): 90 AEROSOL, METERED ORAL at 05:37

## 2021-01-21 RX ADMIN — ENOXAPARIN SODIUM 40 MILLIGRAM(S): 100 INJECTION SUBCUTANEOUS at 15:08

## 2021-01-21 RX ADMIN — Medication 6 MILLIGRAM(S): at 15:08

## 2021-01-21 RX ADMIN — Medication 40 MILLIGRAM(S): at 05:24

## 2021-01-21 RX ADMIN — BUDESONIDE AND FORMOTEROL FUMARATE DIHYDRATE 2 PUFF(S): 160; 4.5 AEROSOL RESPIRATORY (INHALATION) at 10:14

## 2021-01-21 RX ADMIN — PANTOPRAZOLE SODIUM 40 MILLIGRAM(S): 20 TABLET, DELAYED RELEASE ORAL at 05:23

## 2021-01-21 RX ADMIN — ENOXAPARIN SODIUM 40 MILLIGRAM(S): 100 INJECTION SUBCUTANEOUS at 05:23

## 2021-01-21 RX ADMIN — Medication 100 MILLIGRAM(S): at 05:23

## 2021-01-21 RX ADMIN — Medication 40 MILLIEQUIVALENT(S): at 15:08

## 2021-01-21 RX ADMIN — Medication 650 MILLIGRAM(S): at 05:50

## 2021-01-21 NOTE — PROGRESS NOTE ADULT - PROBLEM SELECTOR PLAN 2
- acute on chronic asthma exacerbation likely 2/2 COVID19 Infection   - Wheezing + Shortness of breath + Cough  - s/p 125mg Solumedrol, started on decadron   - continue home inhalers  - Albuterol PRN  - O2 PRN

## 2021-01-21 NOTE — PROGRESS NOTE ADULT - SUBJECTIVE AND OBJECTIVE BOX
Patient is a 30y old  Female who presents with a chief complaint of shortness of breath (20 Jan 2021 14:39)      SUBJECTIVE / OVERNIGHT EVENTS: Pt seen and examined at 11:50am, had desat to 88% on RA early am now on oxygen via nasal canula at 2 lit/mt,  pt reports worse cough upon standing up and sob upon activity. Sats ok on nasal canula oxygen, discussed with pt about Remdesivir, is agreeable now, spoke to pt's sister Emma upon pt's request about Remdesivir now agreeable for Remdesivir. No other new issues reported.        MEDICATIONS  (STANDING):  budesonide  80 MICROgram(s)/formoterol 4.5 MICROgram(s) Inhaler 2 Puff(s) Inhalation two times a day  dexAMETHasone     Tablet 6 milliGRAM(s) Oral daily  enoxaparin Injectable 40 milliGRAM(s) SubCutaneous every 12 hours  pantoprazole    Tablet 40 milliGRAM(s) Oral before breakfast  potassium chloride    Tablet ER 40 milliEquivalent(s) Oral once  remdesivir IVPB ([id number])   IV Intermittent     MEDICATIONS  (PRN):  acetaminophen   Tablet .. 650 milliGRAM(s) Oral every 6 hours PRN Temp greater or equal to 38C (100.4F)  ALBUTerol    90 MICROgram(s) HFA Inhaler 2 Puff(s) Inhalation every 4 hours PRN Shortness of Breath and/or Wheezing  benzonatate 100 milliGRAM(s) Oral three times a day PRN Cough      Vital Signs Last 24 Hrs  T(C): 36.4 (21 Jan 2021 10:14), Max: 37.8 (21 Jan 2021 05:40)  T(F): 97.6 (21 Jan 2021 10:14), Max: 100 (21 Jan 2021 05:40)  HR: 95 (21 Jan 2021 10:14) (95 - 120)  BP: 112/68 (21 Jan 2021 10:14) (112/68 - 129/74)  BP(mean): --  RR: 18 (21 Jan 2021 10:14) (18 - 20)  SpO2: 98% (21 Jan 2021 10:14) (88% - 98%)  CAPILLARY BLOOD GLUCOSE        I&O's Summary      PHYSICAL EXAM:  GENERAL: NAD, Obese female  CHEST/LUNG: Decreased bs bilaterally; No wheeze, on nasal canula oxygen  HEART: Regular rate and rhythm; No murmurs  ABDOMEN: Soft, Nontender, Nondistended  EXTREMITIES: no LE edema  PSYCH: Calm  NEUROLOGY: AAOx3  SKIN: No rashes or lesions      LABS:                        11.9   8.15  )-----------( 185      ( 21 Jan 2021 07:23 )             39.3     01-21    135  |  101  |  13  ----------------------------<  129<H>  3.4<L>   |  20<L>  |  0.78    Ca    8.5      21 Jan 2021 07:23  Phos  3.1     01-20  Mg     2.1     01-20    TPro  7.1  /  Alb  3.5  /  TBili  0.3  /  DBili  x   /  AST  89<H>  /  ALT  81<H>  /  AlkPhos  59  01-21    PT/INR - ( 20 Jan 2021 07:20 )   PT: 13.1 sec;   INR: 1.15 ratio         PTT - ( 20 Jan 2021 07:20 )  PTT:25.3 sec  CARDIAC MARKERS ( 20 Jan 2021 07:20 )  x     / x     / 206 U/L / x     / x      CARDIAC MARKERS ( 19 Jan 2021 17:36 )  x     / x     / 142 U/L / x     / <1.0 ng/mL          RADIOLOGY & ADDITIONAL TESTS:    Imaging Personally Reviewed:    Consultant(s) Notes Reviewed:      Care Discussed with Consultants/Other Providers:

## 2021-01-21 NOTE — PROGRESS NOTE ADULT - PROBLEM SELECTOR PLAN 1
- COVID Detected , virals sepsis in setting of Covid19   - EKG: Sinus Tachycardia@104 qtc 447  - Vitals q4  - monitor respiratory status closely, sat had decreased to 88% early am and was placed on nasal canula oxygen, pt now agreeable for Remdesivir, will start on Remdesivir and decadron, cont oxygen escalate as needed

## 2021-01-21 NOTE — PROGRESS NOTE ADULT - PROBLEM SELECTOR PLAN 7
1.  Name of PCP:   2.  PCP Contacted on Admission: [ ] Y    [x] N - admitted at night    3.  PCP contacted at Discharge: [ ] Y    [ ] N    [ ] N/A  4.  Post-Discharge Appointment Date and Location:  5.  Summary of Handoff given to PCP: DVT ppx: LOVENOX

## 2021-01-21 NOTE — PROGRESS NOTE ADULT - PROBLEM SELECTOR PLAN 4
-  likely from covid/asthma exacerbation  - trop<6  - pain control PRN w/ Tylenol  - f/u TTE  - EKG: Sinus Tachycardia@104 qtc 447  - Chest Xray: IMPRESSION: Bilateral peripheral opacities consistent with covid 19 multifocal pneumonia.

## 2021-01-22 LAB
ALBUMIN SERPL ELPH-MCNC: 3.6 G/DL — SIGNIFICANT CHANGE UP (ref 3.3–5)
ALP SERPL-CCNC: 53 U/L — SIGNIFICANT CHANGE UP (ref 40–120)
ALT FLD-CCNC: 63 U/L — HIGH (ref 4–33)
ANION GAP SERPL CALC-SCNC: 14 MMOL/L — SIGNIFICANT CHANGE UP (ref 7–14)
AST SERPL-CCNC: 31 U/L — SIGNIFICANT CHANGE UP (ref 4–32)
BASOPHILS # BLD AUTO: 0.01 K/UL — SIGNIFICANT CHANGE UP (ref 0–0.2)
BASOPHILS NFR BLD AUTO: 0.1 % — SIGNIFICANT CHANGE UP (ref 0–2)
BILIRUB SERPL-MCNC: 0.3 MG/DL — SIGNIFICANT CHANGE UP (ref 0.2–1.2)
BUN SERPL-MCNC: 10 MG/DL — SIGNIFICANT CHANGE UP (ref 7–23)
CALCIUM SERPL-MCNC: 8.4 MG/DL — SIGNIFICANT CHANGE UP (ref 8.4–10.5)
CHLORIDE SERPL-SCNC: 104 MMOL/L — SIGNIFICANT CHANGE UP (ref 98–107)
CO2 SERPL-SCNC: 20 MMOL/L — LOW (ref 22–31)
CREAT SERPL-MCNC: 0.57 MG/DL — SIGNIFICANT CHANGE UP (ref 0.5–1.3)
EOSINOPHIL # BLD AUTO: 0 K/UL — SIGNIFICANT CHANGE UP (ref 0–0.5)
EOSINOPHIL NFR BLD AUTO: 0 % — SIGNIFICANT CHANGE UP (ref 0–6)
GLUCOSE SERPL-MCNC: 117 MG/DL — HIGH (ref 70–99)
HCT VFR BLD CALC: 38.9 % — SIGNIFICANT CHANGE UP (ref 34.5–45)
HGB BLD-MCNC: 11.6 G/DL — SIGNIFICANT CHANGE UP (ref 11.5–15.5)
IANC: 6.38 K/UL — SIGNIFICANT CHANGE UP (ref 1.5–8.5)
IMM GRANULOCYTES NFR BLD AUTO: 0.6 % — SIGNIFICANT CHANGE UP (ref 0–1.5)
LYMPHOCYTES # BLD AUTO: 1.17 K/UL — SIGNIFICANT CHANGE UP (ref 1–3.3)
LYMPHOCYTES # BLD AUTO: 14.4 % — SIGNIFICANT CHANGE UP (ref 13–44)
MCHC RBC-ENTMCNC: 24.5 PG — LOW (ref 27–34)
MCHC RBC-ENTMCNC: 29.8 GM/DL — LOW (ref 32–36)
MCV RBC AUTO: 82.2 FL — SIGNIFICANT CHANGE UP (ref 80–100)
MONOCYTES # BLD AUTO: 0.51 K/UL — SIGNIFICANT CHANGE UP (ref 0–0.9)
MONOCYTES NFR BLD AUTO: 6.3 % — SIGNIFICANT CHANGE UP (ref 2–14)
NEUTROPHILS # BLD AUTO: 6.38 K/UL — SIGNIFICANT CHANGE UP (ref 1.8–7.4)
NEUTROPHILS NFR BLD AUTO: 78.6 % — HIGH (ref 43–77)
NRBC # BLD: 0 /100 WBCS — SIGNIFICANT CHANGE UP
NRBC # FLD: 0 K/UL — SIGNIFICANT CHANGE UP
PLATELET # BLD AUTO: 182 K/UL — SIGNIFICANT CHANGE UP (ref 150–400)
POTASSIUM SERPL-MCNC: 3.6 MMOL/L — SIGNIFICANT CHANGE UP (ref 3.5–5.3)
POTASSIUM SERPL-SCNC: 3.6 MMOL/L — SIGNIFICANT CHANGE UP (ref 3.5–5.3)
PROT SERPL-MCNC: 7.1 G/DL — SIGNIFICANT CHANGE UP (ref 6–8.3)
RBC # BLD: 4.73 M/UL — SIGNIFICANT CHANGE UP (ref 3.8–5.2)
RBC # FLD: 14.3 % — SIGNIFICANT CHANGE UP (ref 10.3–14.5)
SODIUM SERPL-SCNC: 138 MMOL/L — SIGNIFICANT CHANGE UP (ref 135–145)
WBC # BLD: 8.12 K/UL — SIGNIFICANT CHANGE UP (ref 3.8–10.5)
WBC # FLD AUTO: 8.12 K/UL — SIGNIFICANT CHANGE UP (ref 3.8–10.5)

## 2021-01-22 PROCEDURE — 99233 SBSQ HOSP IP/OBS HIGH 50: CPT

## 2021-01-22 RX ORDER — ACETAMINOPHEN 500 MG
650 TABLET ORAL ONCE
Refills: 0 | Status: COMPLETED | OUTPATIENT
Start: 2021-01-22 | End: 2021-01-22

## 2021-01-22 RX ORDER — POTASSIUM CHLORIDE 20 MEQ
20 PACKET (EA) ORAL ONCE
Refills: 0 | Status: COMPLETED | OUTPATIENT
Start: 2021-01-22 | End: 2021-01-22

## 2021-01-22 RX ORDER — ALBUTEROL 90 UG/1
2 AEROSOL, METERED ORAL
Qty: 0 | Refills: 0 | DISCHARGE
Start: 2021-01-22

## 2021-01-22 RX ORDER — OXYCODONE AND ACETAMINOPHEN 5; 325 MG/1; MG/1
1 TABLET ORAL ONCE
Refills: 0 | Status: DISCONTINUED | OUTPATIENT
Start: 2021-01-22 | End: 2021-01-22

## 2021-01-22 RX ADMIN — BUDESONIDE AND FORMOTEROL FUMARATE DIHYDRATE 2 PUFF(S): 160; 4.5 AEROSOL RESPIRATORY (INHALATION) at 20:53

## 2021-01-22 RX ADMIN — ALBUTEROL 2 PUFF(S): 90 AEROSOL, METERED ORAL at 20:51

## 2021-01-22 RX ADMIN — PANTOPRAZOLE SODIUM 40 MILLIGRAM(S): 20 TABLET, DELAYED RELEASE ORAL at 05:27

## 2021-01-22 RX ADMIN — BUDESONIDE AND FORMOTEROL FUMARATE DIHYDRATE 2 PUFF(S): 160; 4.5 AEROSOL RESPIRATORY (INHALATION) at 11:29

## 2021-01-22 RX ADMIN — ENOXAPARIN SODIUM 40 MILLIGRAM(S): 100 INJECTION SUBCUTANEOUS at 18:53

## 2021-01-22 RX ADMIN — Medication 650 MILLIGRAM(S): at 05:17

## 2021-01-22 RX ADMIN — Medication 20 MILLIEQUIVALENT(S): at 11:42

## 2021-01-22 RX ADMIN — Medication 100 MILLIGRAM(S): at 05:27

## 2021-01-22 RX ADMIN — BUDESONIDE AND FORMOTEROL FUMARATE DIHYDRATE 2 PUFF(S): 160; 4.5 AEROSOL RESPIRATORY (INHALATION) at 00:29

## 2021-01-22 RX ADMIN — ENOXAPARIN SODIUM 40 MILLIGRAM(S): 100 INJECTION SUBCUTANEOUS at 05:27

## 2021-01-22 RX ADMIN — Medication 6 MILLIGRAM(S): at 05:43

## 2021-01-22 RX ADMIN — OXYCODONE AND ACETAMINOPHEN 1 TABLET(S): 5; 325 TABLET ORAL at 20:50

## 2021-01-22 RX ADMIN — Medication 100 MILLIGRAM(S): at 20:50

## 2021-01-22 RX ADMIN — REMDESIVIR 500 MILLIGRAM(S): 5 INJECTION INTRAVENOUS at 18:55

## 2021-01-22 NOTE — DISCHARGE NOTE PROVIDER - HOSPITAL COURSE
30 y/ Female with PMHx of Asthma( on Symbicort) presents to [facility] from  for shortness of breath, cough , wheezing , chest pain ,Fever, chills, and Body aches. Pt was refereed to [facility] from , pt was diagnosed with COVID-19, Pt is being admitted for asthma exacerbation 2/2 COVID19 and Chest Pain.     Pneumonia with viral sepsis due to COVID-19 virus.   - Covid pcr positive  - Chest Xray: IMPRESSION: Bilateral peripheral opacities consistent with covid 19 multifocal pneumonia.   - Oxygen saturation monitored. Supplemental oxygen as needed.   - Ferritin , LDH , CRP trended  - cont nasal canula oxygen, may use NRB as needed  - PRN Tylenol for fever > 100.4   - Contact, airborne, droplet isolation precautions  - Pt was started on remdesivir and decadron     Acute asthma exacerbation.   - acute on chronic asthma exacerbation likely 2/2 COVID19 Infection   - Wheezing + Shortness of breath + Cough  - s/p 125mg Solumedrol, started on decadron   - continue home inhalers  - Albuterol PRN  - O2 PRN.     Chest pain.    -  likely from covid/asthma exacerbation  - trop<6  - pain control PRN w/ Tylenol  - f/u TTE  - EKG: Sinus Tachycardia@104 qtc 447  - Chest Xray: IMPRESSION: Bilateral peripheral opacities consistent with covid 19 multifocal pneumonia.      Transaminitis   - elevated LFTs likely 2/2 COVID  - Pt to follow up with PCP for further monitoring of lfts as outpatient.     Hypokalemia  - Potassium repleted  - Pt to follow up with PCP for further monitoring of potassium as outpatient.     DVT prophylaxis  -LOVENOX.    On_________, discussed with __________, patient is medically cleared and optimized for discharge today. All medications were reviewed with attending, and sent to mutually agreed upon pharmacy.   Patient is a 30 year old female with a PMHx of asthma who's here with an acute asthma exacerbation due to COVID-19    Moderate persistent asthma with acute exacerbation.    patient states she feels like she's not improving  given lack of improvement in her symptoms can consult pulmonary for further recommendations  continue Dexamethasone given concomitant COVID, but given slow progression can change from oral to IV  Albuterol MDI changed to standing. Can give Symbicort as well.  Promethazine added as per patient request.     Pneumonia due to COVID-19 virus.    patient's CXR had shown bilateral opacities  continue course of Remdesivir (last dose on 1/25) and complete course of Dexamethasone  is on 4L of oxygen via nasal canula  given complaint of bilateral leg pain checked US to exclude DVT - no evidence of dvt   AST/ALT are within normal limits.     Chest pain.    resolved  likely from covid/asthma exacerbation  trop<6  pain control PRN w/ Tylenol  f/u TTE  EKG: Sinus Tachycardia@104 qtc 447  Chest Xray: IMPRESSION: Bilateral peripheral opacities consistent with covid 19 multifocal pneumonia.     Transaminitis  elevated LFTs likely 2/2 COVID  Pt to follow up with PCP for further monitoring of lfts as outpatient.       On_________, discussed with __________, patient is medically cleared and optimized for discharge today. All medications were reviewed with attending, and sent to mutually agreed upon pharmacy.   Patient is a 30 year old female with a PMHx of asthma who's here with an acute asthma exacerbation due to COVID-19    Moderate persistent asthma with acute exacerbation.    - Seen and evaluated by pulmonology   - Albuterol MDI changed to standing. Can give Symbicort as well.  - Promethazine added as per patient request.     Pneumonia due to COVID-19 virus.    - Covid pcr positive   - CXR had shown bilateral opacities  - SP Remdesivir and Dexamethasone   - Isolation precautions   - Oxygen saturation monitored. Supplemental oxygen given as needed.   - US to exclude DVT - no evidence of dvt     Chest pain.    - resolved  - likely from covid/asthma exacerbation  - trop<6  - pain control PRN w/ Tylenol  -EKG: Sinus Tachycardia@104 qtc 447  - Chest Xray: IMPRESSION: Bilateral peripheral opacities consistent with covid 19 multifocal pneumonia.     Transaminitis  - elevated LFTs likely 2/2 COVID  - Pt to follow up with PCP for further monitoring of lfts as outpatient.       On 1/30/2021, discussed with Dr. Stephens, patient is medically cleared and optimized for discharge today. All medications were reviewed with attending, and sent to mutually agreed upon pharmacy.   Patient is a 30 year old female with a PMHx of asthma who's here with an acute asthma exacerbation due to COVID-19    Moderate persistent asthma with acute exacerbation.    - Seen and evaluated by pulmonology   - Albuterol MDI changed to standing. Can give Symbicort as well.  - Promethazine added as per patient request.     Pneumonia due to COVID-19 virus.    - Covid pcr positive   - CXR had shown bilateral opacities  - SP Remdesivir and Dexamethasone   - Isolation precautions   - Oxygen saturation monitored. Supplemental oxygen given as needed.   - US to exclude DVT - no evidence of dvt     Chest pain.    - resolved  - likely from covid/asthma exacerbation  - trop<6  - pain control PRN w/ Tylenol  -EKG: Sinus Tachycardia@104 qtc 447  - Chest Xray: IMPRESSION: Bilateral peripheral opacities consistent with covid 19 multifocal pneumonia.     Transaminitis  - elevated LFTs likely 2/2 COVID  - Pt to follow up with PCP for further monitoring of lfts as outpatient.       On 1/31/2021, discussed with Dr. Stephens, patient is medically cleared and optimized for discharge today. All medications were reviewed with attending, and sent to mutually agreed upon pharmacy.

## 2021-01-22 NOTE — PROGRESS NOTE ADULT - SUBJECTIVE AND OBJECTIVE BOX
Patient is a 30y old  Female who presents with a chief complaint of shortness of breath (22 Jan 2021 08:38)      SUBJECTIVE / OVERNIGHT EVENTS:  Pt seen and examined at 11:15am, no overnight events. Had 100.2 temp this am, Still with cough and has sob, is off of nasal oxygen but says she is having sob and chest pain. No other new issues reported.        MEDICATIONS  (STANDING):  budesonide  80 MICROgram(s)/formoterol 4.5 MICROgram(s) Inhaler 2 Puff(s) Inhalation two times a day  dexAMETHasone     Tablet 6 milliGRAM(s) Oral daily  enoxaparin Injectable 40 milliGRAM(s) SubCutaneous every 12 hours  pantoprazole    Tablet 40 milliGRAM(s) Oral before breakfast  remdesivir  IVPB   IV Intermittent   remdesivir  IVPB 100 milliGRAM(s) IV Intermittent every 24 hours    MEDICATIONS  (PRN):  acetaminophen   Tablet .. 650 milliGRAM(s) Oral every 6 hours PRN Temp greater or equal to 38C (100.4F)  ALBUTerol    90 MICROgram(s) HFA Inhaler 2 Puff(s) Inhalation every 4 hours PRN Shortness of Breath and/or Wheezing  benzonatate 100 milliGRAM(s) Oral three times a day PRN Cough      Vital Signs Last 24 Hrs  T(C): 37.9 (22 Jan 2021 05:15), Max: 37.9 (22 Jan 2021 05:15)  T(F): 100.2 (22 Jan 2021 05:15), Max: 100.2 (22 Jan 2021 05:15)  HR: 110 (22 Jan 2021 05:15) (110 - 110)  BP: 115/66 (22 Jan 2021 05:15) (115/66 - 115/66)  BP(mean): --  RR: 18 (22 Jan 2021 05:15) (18 - 18)  SpO2: 100% (22 Jan 2021 05:15) (100% - 100%)  CAPILLARY BLOOD GLUCOSE        I&O's Summary      PHYSICAL EXAM:  GENERAL: NAD, Obese female  CHEST/LUNG: Decreased bs bilaterally; No wheeze  HEART: Regular rate and rhythm; No murmurs  ABDOMEN: Soft, Nontender, Nondistended  EXTREMITIES: no LE edema  PSYCH: Calm  NEUROLOGY: AAOx3  SKIN: No rashes or lesions      LABS:                        11.6   8.12  )-----------( 182      ( 22 Jan 2021 07:43 )             38.9     01-22    138  |  104  |  10  ----------------------------<  117<H>  3.6   |  20<L>  |  0.57    Ca    8.4      22 Jan 2021 07:42    TPro  7.1  /  Alb  3.6  /  TBili  0.3  /  DBili  <0.2  /  AST  31  /  ALT  63<H>  /  AlkPhos  53  01-22              RADIOLOGY & ADDITIONAL TESTS:    Imaging Personally Reviewed:    Consultant(s) Notes Reviewed:      Care Discussed with Consultants/Other Providers:

## 2021-01-22 NOTE — PROGRESS NOTE ADULT - PROBLEM SELECTOR PLAN 2
- acute on chronic asthma exacerbation likely 2/2 COVID19 Infection   - Wheezing + Shortness of breath + Cough  - s/p 125mg Solumedrol, cont decadron   - continue home inhalers  - Albuterol PRN  - O2 PRN

## 2021-01-22 NOTE — DISCHARGE NOTE PROVIDER - NSDCMRMEDTOKEN_GEN_ALL_CORE_FT
albuterol 90 mcg/inh inhalation aerosol: 2 puff(s) inhaled every 4 hours, As needed, Shortness of Breath and/or Wheezing  Symbicort 80 mcg-4.5 mcg/inh inhalation aerosol: 2 puff(s) inhaled 2 times a day   albuterol 90 mcg/inh inhalation aerosol: 2 puff(s) inhaled every 6 hours  budesonide-formoterol 160 mcg-4.5 mcg/inh inhalation aerosol: 2 puff(s) inhaled 2 times a day   promethazine 25 mg oral tablet: 1 tab(s) orally every 8 hours, As needed, nausea, cough

## 2021-01-22 NOTE — DISCHARGE NOTE PROVIDER - NSDCCPCAREPLAN_GEN_ALL_CORE_FT
PRINCIPAL DISCHARGE DIAGNOSIS  Diagnosis: Pneumonia due to COVID-19 virus  Assessment and Plan of Treatment: You have been diagnosed with the COVID-19 virus during your hospital stay. You must self quarantine to complete a 14 day time period.  Monitor for fevers, shortness of breath and cough primarily.  Monitor your temperature daily to not any changes and increases.    It has been determined that you no longer need hospitalization and can recover while remaining in self-quarantine at home. You should follow the prevention steps below until a healthcare provider or local or state health department says you can return to your normal activities.  1. You should restrict activities outside your home, except for getting medical care.  2. Do not go to work, school, or public areas.  3. Avoid using public transportation, ride-sharing, or taxis.  4. Separate yourself from other people and animals in your home.  5. Call ahead before visiting your doctor.  6. Wear a facemask.  7. Cover your coughs and sneezes.  8. Clean your hands often.  9. Avoid sharing personal household items.  10. Clean all “high-touch” surfaces everyday.  11. Monitor your symptoms.  If you have a medical emergency and need to call 911, notify the dispatch personnel that you have COVID-19 If possible, put on a facemask before emergency medical services arrive.  12. Stopping home isolation.  Patients with confirmed COVID-19 should remain under home isolation precautions for 14 days since the positive COVID-19 test and until the risk of secondary transmission to others is thought to be low. The decision to discontinue home isolation precautions should be made on a case-by-case basis, in consultation with healthcare providers and state and local health departments. Your [facility] Department of Health can be reached at [phone] for further information about COVID-19.        SECONDARY DISCHARGE DIAGNOSES  Diagnosis: Hypokalemia  Assessment and Plan of Treatment: Your potassium was found to be low. Your potassium was repleted. Follow up with PCP for further monitoring of your potassium.    Diagnosis: Acute asthma exacerbation  Assessment and Plan of Treatment: Continue current medications as prescribed.  Avoid exposures to environmental allergens such as carpets, pets and both first-hand and second-hand smoking.  Follow up your routine medical appointments.    Diagnosis: Transaminitis  Assessment and Plan of Treatment: Your liver function tests were found to be elevated. This is likely due to covid virus. You are to follow up with your PCP in 1-2 weeks for further monitoring of your liver function tests.     PRINCIPAL DISCHARGE DIAGNOSIS  Diagnosis: Pneumonia due to COVID-19 virus  Assessment and Plan of Treatment: You have been diagnosed with the COVID-19 virus during your hospital stay. You must self quarantine to complete a 14 day time period.  Monitor for fevers, shortness of breath and cough primarily.  Monitor your temperature daily to not any changes and increases.    It has been determined that you no longer need hospitalization and can recover while remaining in self-quarantine at home. You should follow the prevention steps below until a healthcare provider or local or state health department says you can return to your normal activities.  1. You should restrict activities outside your home, except for getting medical care.  2. Do not go to work, school, or public areas.  3. Avoid using public transportation, ride-sharing, or taxis.  4. Separate yourself from other people and animals in your home.  5. Call ahead before visiting your doctor.  6. Wear a facemask.  7. Cover your coughs and sneezes.  8. Clean your hands often.  9. Avoid sharing personal household items.  10. Clean all “high-touch” surfaces everyday.  11. Monitor your symptoms.  If you have a medical emergency and need to call 911, notify the dispatch personnel that you have COVID-19 If possible, put on a facemask before emergency medical services arrive.  12. Stopping home isolation.  Patients with confirmed COVID-19 should remain under home isolation precautions for 14 days since the positive COVID-19 test and until the risk of secondary transmission to others is thought to be low. The decision to discontinue home isolation precautions should be made on a case-by-case basis, in consultation with healthcare providers and state and local health departments. Your [facility] Department of Health can be reached at [phone] for further information about COVID-19.        SECONDARY DISCHARGE DIAGNOSES  Diagnosis: Acute asthma exacerbation  Assessment and Plan of Treatment: Continue current medications as prescribed.  Avoid exposures to environmental allergens such as carpets, pets and both first-hand and second-hand smoking.  Follow up your routine medical appointments.    Diagnosis: Hypokalemia  Assessment and Plan of Treatment: Your potassium was found to be low. Your potassium was repleted. Follow up with PCP for further monitoring of your potassium.    Diagnosis: Transaminitis  Assessment and Plan of Treatment: Your liver function tests were found to be elevated. This is likely due to covid virus. You are to follow up with your PCP in 1-2 weeks for further monitoring of your liver function tests.

## 2021-01-22 NOTE — PROGRESS NOTE ADULT - PROBLEM SELECTOR PLAN 1
- COVID Detected , virals sepsis in setting of Covid19   - Vitals q4  - monitor respiratory status closely, monitor oxygen sats, closely, escalate oxygen as needed, cont Remdesivir and decadron

## 2021-01-23 LAB
ALBUMIN SERPL ELPH-MCNC: 3.3 G/DL — SIGNIFICANT CHANGE UP (ref 3.3–5)
ALP SERPL-CCNC: 49 U/L — SIGNIFICANT CHANGE UP (ref 40–120)
ALT FLD-CCNC: 49 U/L — HIGH (ref 4–33)
ANION GAP SERPL CALC-SCNC: 12 MMOL/L — SIGNIFICANT CHANGE UP (ref 7–14)
AST SERPL-CCNC: 26 U/L — SIGNIFICANT CHANGE UP (ref 4–32)
BASOPHILS # BLD AUTO: 0 K/UL — SIGNIFICANT CHANGE UP (ref 0–0.2)
BASOPHILS NFR BLD AUTO: 0 % — SIGNIFICANT CHANGE UP (ref 0–2)
BILIRUB SERPL-MCNC: 0.2 MG/DL — SIGNIFICANT CHANGE UP (ref 0.2–1.2)
BUN SERPL-MCNC: 11 MG/DL — SIGNIFICANT CHANGE UP (ref 7–23)
CALCIUM SERPL-MCNC: 8.7 MG/DL — SIGNIFICANT CHANGE UP (ref 8.4–10.5)
CHLORIDE SERPL-SCNC: 105 MMOL/L — SIGNIFICANT CHANGE UP (ref 98–107)
CO2 SERPL-SCNC: 22 MMOL/L — SIGNIFICANT CHANGE UP (ref 22–31)
CREAT SERPL-MCNC: 0.6 MG/DL — SIGNIFICANT CHANGE UP (ref 0.5–1.3)
CRP SERPL-MCNC: 28 MG/L — HIGH
D DIMER BLD IA.RAPID-MCNC: 258 NG/ML DDU — HIGH
EOSINOPHIL # BLD AUTO: 0 K/UL — SIGNIFICANT CHANGE UP (ref 0–0.5)
EOSINOPHIL NFR BLD AUTO: 0 % — SIGNIFICANT CHANGE UP (ref 0–6)
FERRITIN SERPL-MCNC: 295 NG/ML — HIGH (ref 15–150)
GLUCOSE SERPL-MCNC: 112 MG/DL — HIGH (ref 70–99)
HCT VFR BLD CALC: 37 % — SIGNIFICANT CHANGE UP (ref 34.5–45)
HGB BLD-MCNC: 11.3 G/DL — LOW (ref 11.5–15.5)
IANC: 5.99 K/UL — SIGNIFICANT CHANGE UP (ref 1.5–8.5)
LDH SERPL L TO P-CCNC: 480 U/L — HIGH (ref 135–225)
LYMPHOCYTES # BLD AUTO: 0.94 K/UL — LOW (ref 1–3.3)
LYMPHOCYTES # BLD AUTO: 11.1 % — LOW (ref 13–44)
MCHC RBC-ENTMCNC: 24.7 PG — LOW (ref 27–34)
MCHC RBC-ENTMCNC: 30.5 GM/DL — LOW (ref 32–36)
MCV RBC AUTO: 81 FL — SIGNIFICANT CHANGE UP (ref 80–100)
MONOCYTES # BLD AUTO: 0.66 K/UL — SIGNIFICANT CHANGE UP (ref 0–0.9)
MONOCYTES NFR BLD AUTO: 7.7 % — SIGNIFICANT CHANGE UP (ref 2–14)
NEUTROPHILS # BLD AUTO: 6.54 K/UL — SIGNIFICANT CHANGE UP (ref 1.8–7.4)
NEUTROPHILS NFR BLD AUTO: 76.1 % — SIGNIFICANT CHANGE UP (ref 43–77)
PLATELET # BLD AUTO: 208 K/UL — SIGNIFICANT CHANGE UP (ref 150–400)
POTASSIUM SERPL-MCNC: 4.3 MMOL/L — SIGNIFICANT CHANGE UP (ref 3.5–5.3)
POTASSIUM SERPL-SCNC: 4.3 MMOL/L — SIGNIFICANT CHANGE UP (ref 3.5–5.3)
PROT SERPL-MCNC: 6.9 G/DL — SIGNIFICANT CHANGE UP (ref 6–8.3)
RBC # BLD: 4.57 M/UL — SIGNIFICANT CHANGE UP (ref 3.8–5.2)
RBC # FLD: 14.4 % — SIGNIFICANT CHANGE UP (ref 10.3–14.5)
SODIUM SERPL-SCNC: 139 MMOL/L — SIGNIFICANT CHANGE UP (ref 135–145)
WBC # BLD: 8.51 K/UL — SIGNIFICANT CHANGE UP (ref 3.8–10.5)
WBC # FLD AUTO: 8.51 K/UL — SIGNIFICANT CHANGE UP (ref 3.8–10.5)

## 2021-01-23 PROCEDURE — 99233 SBSQ HOSP IP/OBS HIGH 50: CPT

## 2021-01-23 RX ADMIN — ENOXAPARIN SODIUM 40 MILLIGRAM(S): 100 INJECTION SUBCUTANEOUS at 16:07

## 2021-01-23 RX ADMIN — Medication 100 MILLIGRAM(S): at 21:39

## 2021-01-23 RX ADMIN — REMDESIVIR 500 MILLIGRAM(S): 5 INJECTION INTRAVENOUS at 15:31

## 2021-01-23 RX ADMIN — Medication 100 MILLIGRAM(S): at 13:42

## 2021-01-23 RX ADMIN — PANTOPRAZOLE SODIUM 40 MILLIGRAM(S): 20 TABLET, DELAYED RELEASE ORAL at 06:00

## 2021-01-23 RX ADMIN — BUDESONIDE AND FORMOTEROL FUMARATE DIHYDRATE 2 PUFF(S): 160; 4.5 AEROSOL RESPIRATORY (INHALATION) at 21:36

## 2021-01-23 RX ADMIN — Medication 6 MILLIGRAM(S): at 06:00

## 2021-01-23 RX ADMIN — ENOXAPARIN SODIUM 40 MILLIGRAM(S): 100 INJECTION SUBCUTANEOUS at 21:36

## 2021-01-23 RX ADMIN — ENOXAPARIN SODIUM 40 MILLIGRAM(S): 100 INJECTION SUBCUTANEOUS at 05:59

## 2021-01-23 RX ADMIN — ALBUTEROL 2 PUFF(S): 90 AEROSOL, METERED ORAL at 21:34

## 2021-01-23 NOTE — PROGRESS NOTE ADULT - SUBJECTIVE AND OBJECTIVE BOX
SANA GARCIA  30y  Female      Patient is a 30y old  Female who presents with a chief complaint of shortness of breath (22 Jan 2021 12:17)      INTERVAL HPI/OVERNIGHT EVENTS:        REVIEW OF SYSTEMS:  CONSTITUTIONAL: No fever, weight loss, or fatigue  EYES: No eye pain, visual disturbances, or discharge  ENMT:  No difficulty hearing, tinnitus, vertigo; No sinus or throat pain  NECK: No pain or stiffness  BREASTS: No pain, masses, or nipple discharge  RESPIRATORY: No cough, wheezing, chills or hemoptysis; No shortness of breath  CARDIOVASCULAR: No chest pain, palpitations, dizziness, or leg swelling  GASTROINTESTINAL: No abdominal or epigastric pain. No nausea, vomiting, or hematemesis; No diarrhea or constipation. No melena or hematochezia.  GENITOURINARY: No dysuria, frequency, hematuria, or incontinence  NEUROLOGICAL: No headaches, memory loss, loss of strength, numbness, or tremors  SKIN: No itching, burning, rashes, or lesions   LYMPH NODES: No enlarged glands  ENDOCRINE: No heat or cold intolerance; No hair loss  MUSCULOSKELETAL: No joint pain or swelling; No muscle, back, or extremity pain  PSYCHIATRIC: No depression, anxiety, mood swings, or difficulty sleeping  HEME/LYMPH: No easy bruising, or bleeding gums  ALLERY AND IMMUNOLOGIC: No hives or eczema    T(C): 36.6 (01-22-21 @ 18:50), Max: 36.6 (01-22-21 @ 18:50)  HR: 97 (01-22-21 @ 18:50) (97 - 97)  BP: 122/69 (01-22-21 @ 18:50) (122/69 - 122/69)  RR: 18 (01-22-21 @ 18:50) (18 - 18)  SpO2: 98% (01-22-21 @ 18:50) (98% - 98%)  Wt(kg): --Vital Signs Last 24 Hrs  T(C): 36.6 (22 Jan 2021 18:50), Max: 36.6 (22 Jan 2021 18:50)  T(F): 97.9 (22 Jan 2021 18:50), Max: 97.9 (22 Jan 2021 18:50)  HR: 97 (22 Jan 2021 18:50) (97 - 97)  BP: 122/69 (22 Jan 2021 18:50) (122/69 - 122/69)  BP(mean): --  RR: 18 (22 Jan 2021 18:50) (18 - 18)  SpO2: 98% (22 Jan 2021 18:50) (98% - 98%)    PHYSICAL EXAM:  GENERAL: NAD, Obese female  CHEST/LUNG: Decreased bs bilaterally; No wheeze  HEART: Regular rate and rhythm; No murmurs  ABDOMEN: Soft, Nontender, Nondistended  EXTREMITIES: no LE edema  PSYCH: Calm  NEUROLOGY: AAOx3  SKIN: No rashes or lesions    Consultant(s) Notes Reviewed:  [x ] YES  [ ] NO  Care Discussed with Consultants/Other Providers [ x] YES  [ ] NO    LABS:                        11.3   8.51  )-----------( 208      ( 23 Jan 2021 07:20 )             37.0     01-23    139  |  105  |  11  ----------------------------<  112<H>  4.3   |  22  |  0.60    Ca    8.7      23 Jan 2021 07:20    TPro  6.9  /  Alb  3.3  /  TBili  0.2  /  DBili  x   /  AST  26  /  ALT  49<H>  /  AlkPhos  49  01-23        CAPILLARY BLOOD GLUCOSE                RADIOLOGY & ADDITIONAL TESTS:    Imaging Personally Reviewed:  [ ] YES  [ ] NO SANA GARCIA  30y  Female      Patient is a 30y old  Female who presents with a chief complaint of shortness of breath (22 Jan 2021 12:17)      INTERVAL HPI/OVERNIGHT EVENTS: Pt reports still with significant shortness of breath with minor exertion. Persistent dry cough. Otherwise denies fevers, chills, chest pain.         REVIEW OF SYSTEMS:  Neg unless otherwise noted above    T(C): 36.6 (01-22-21 @ 18:50), Max: 36.6 (01-22-21 @ 18:50)  HR: 97 (01-22-21 @ 18:50) (97 - 97)  BP: 122/69 (01-22-21 @ 18:50) (122/69 - 122/69)  RR: 18 (01-22-21 @ 18:50) (18 - 18)  SpO2: 98% (01-22-21 @ 18:50) (98% - 98%)  Wt(kg): --Vital Signs Last 24 Hrs  T(C): 36.6 (22 Jan 2021 18:50), Max: 36.6 (22 Jan 2021 18:50)  T(F): 97.9 (22 Jan 2021 18:50), Max: 97.9 (22 Jan 2021 18:50)  HR: 97 (22 Jan 2021 18:50) (97 - 97)  BP: 122/69 (22 Jan 2021 18:50) (122/69 - 122/69)  BP(mean): --  RR: 18 (22 Jan 2021 18:50) (18 - 18)  SpO2: 98% (22 Jan 2021 18:50) (98% - 98%)    PHYSICAL EXAM:  GENERAL: NAD, Obese female  CHEST/LUNG: Decreased bs bilaterally; No wheeze  HEART: Regular rate and rhythm; No murmurs  ABDOMEN: Soft, Nontender, Nondistended  EXTREMITIES: no LE edema  PSYCH: Calm  NEUROLOGY: AAOx3  SKIN: No rashes or lesions    Consultant(s) Notes Reviewed:  [x ] YES  [ ] NO  Care Discussed with Consultants/Other Providers [ x] YES  [ ] NO    LABS:                        11.3   8.51  )-----------( 208      ( 23 Jan 2021 07:20 )             37.0     01-23    139  |  105  |  11  ----------------------------<  112<H>  4.3   |  22  |  0.60    Ca    8.7      23 Jan 2021 07:20    TPro  6.9  /  Alb  3.3  /  TBili  0.2  /  DBili  x   /  AST  26  /  ALT  49<H>  /  AlkPhos  49  01-23        CAPILLARY BLOOD GLUCOSE                RADIOLOGY & ADDITIONAL TESTS:    Imaging Personally Reviewed:  [ ] YES  [ ] NO

## 2021-01-23 NOTE — PROGRESS NOTE ADULT - PROBLEM SELECTOR PLAN 2
- acute on chronic asthma exacerbation likely 2/2 COVID19 Infection   - Wheezing + Shortness of breath + Cough  - s/p 125mg Solumedrol, cont decadron   - continue home inhalers  - Albuterol PRN  - O2 PRN - acute on chronic asthma exacerbation likely 2/2 COVID19 Infection   - Wheezing + Shortness of breath + Cough  - s/p 125mg Solumedrol, cont decadron. Wheezing resolved  - continue home inhalers  - Albuterol PRN  - O2 PRN

## 2021-01-23 NOTE — PROGRESS NOTE ADULT - PROBLEM SELECTOR PLAN 4
-  likely from covid/asthma exacerbation  - trop<6  - pain control PRN w/ Tylenol  - EKG: Sinus Tachycardia@104 qtc 447  - Chest Xray: IMPRESSION: Bilateral peripheral opacities consistent with covid 19 multifocal pneumonia.

## 2021-01-24 LAB
BASOPHILS # BLD AUTO: 0.02 K/UL — SIGNIFICANT CHANGE UP (ref 0–0.2)
BASOPHILS NFR BLD AUTO: 0.3 % — SIGNIFICANT CHANGE UP (ref 0–2)
EOSINOPHIL # BLD AUTO: 0 K/UL — SIGNIFICANT CHANGE UP (ref 0–0.5)
EOSINOPHIL NFR BLD AUTO: 0 % — SIGNIFICANT CHANGE UP (ref 0–6)
HCT VFR BLD CALC: 40.3 % — SIGNIFICANT CHANGE UP (ref 34.5–45)
HGB BLD-MCNC: 11.8 G/DL — SIGNIFICANT CHANGE UP (ref 11.5–15.5)
IANC: 3.55 K/UL — SIGNIFICANT CHANGE UP (ref 1.5–8.5)
IMM GRANULOCYTES NFR BLD AUTO: 0.5 % — SIGNIFICANT CHANGE UP (ref 0–1.5)
LYMPHOCYTES # BLD AUTO: 1.92 K/UL — SIGNIFICANT CHANGE UP (ref 1–3.3)
LYMPHOCYTES # BLD AUTO: 30.8 % — SIGNIFICANT CHANGE UP (ref 13–44)
MCHC RBC-ENTMCNC: 24.4 PG — LOW (ref 27–34)
MCHC RBC-ENTMCNC: 29.3 GM/DL — LOW (ref 32–36)
MCV RBC AUTO: 83.3 FL — SIGNIFICANT CHANGE UP (ref 80–100)
MONOCYTES # BLD AUTO: 0.72 K/UL — SIGNIFICANT CHANGE UP (ref 0–0.9)
MONOCYTES NFR BLD AUTO: 11.5 % — SIGNIFICANT CHANGE UP (ref 2–14)
NEUTROPHILS # BLD AUTO: 3.55 K/UL — SIGNIFICANT CHANGE UP (ref 1.8–7.4)
NEUTROPHILS NFR BLD AUTO: 56.9 % — SIGNIFICANT CHANGE UP (ref 43–77)
NRBC # BLD: 0 /100 WBCS — SIGNIFICANT CHANGE UP
NRBC # FLD: 0 K/UL — SIGNIFICANT CHANGE UP
PLATELET # BLD AUTO: 227 K/UL — SIGNIFICANT CHANGE UP (ref 150–400)
RBC # BLD: 4.84 M/UL — SIGNIFICANT CHANGE UP (ref 3.8–5.2)
RBC # FLD: 14.1 % — SIGNIFICANT CHANGE UP (ref 10.3–14.5)
WBC # BLD: 6.24 K/UL — SIGNIFICANT CHANGE UP (ref 3.8–10.5)
WBC # FLD AUTO: 6.24 K/UL — SIGNIFICANT CHANGE UP (ref 3.8–10.5)

## 2021-01-24 PROCEDURE — 99233 SBSQ HOSP IP/OBS HIGH 50: CPT

## 2021-01-24 RX ORDER — ACETAMINOPHEN 500 MG
650 TABLET ORAL ONCE
Refills: 0 | Status: COMPLETED | OUTPATIENT
Start: 2021-01-24 | End: 2021-01-24

## 2021-01-24 RX ADMIN — Medication 650 MILLIGRAM(S): at 18:45

## 2021-01-24 RX ADMIN — Medication 100 MILLIGRAM(S): at 16:26

## 2021-01-24 RX ADMIN — ENOXAPARIN SODIUM 40 MILLIGRAM(S): 100 INJECTION SUBCUTANEOUS at 20:26

## 2021-01-24 RX ADMIN — REMDESIVIR 500 MILLIGRAM(S): 5 INJECTION INTRAVENOUS at 16:25

## 2021-01-24 RX ADMIN — Medication 100 MILLIGRAM(S): at 16:25

## 2021-01-24 RX ADMIN — ALBUTEROL 2 PUFF(S): 90 AEROSOL, METERED ORAL at 10:37

## 2021-01-24 RX ADMIN — Medication 6 MILLIGRAM(S): at 06:16

## 2021-01-24 RX ADMIN — ALBUTEROL 2 PUFF(S): 90 AEROSOL, METERED ORAL at 20:25

## 2021-01-24 RX ADMIN — Medication 100 MILLIGRAM(S): at 06:20

## 2021-01-24 RX ADMIN — PANTOPRAZOLE SODIUM 40 MILLIGRAM(S): 20 TABLET, DELAYED RELEASE ORAL at 06:16

## 2021-01-24 RX ADMIN — BUDESONIDE AND FORMOTEROL FUMARATE DIHYDRATE 2 PUFF(S): 160; 4.5 AEROSOL RESPIRATORY (INHALATION) at 10:38

## 2021-01-24 RX ADMIN — BUDESONIDE AND FORMOTEROL FUMARATE DIHYDRATE 2 PUFF(S): 160; 4.5 AEROSOL RESPIRATORY (INHALATION) at 20:26

## 2021-01-24 NOTE — PROGRESS NOTE ADULT - SUBJECTIVE AND OBJECTIVE BOX
SANA GARCIA  30y  Female      Patient is a 30y old  Female who presents with a chief complaint of shortness of breath (23 Jan 2021 10:36)      INTERVAL HPI/OVERNIGHT EVENTS: Pt reports persistent shortness of breath, minimal improvement. Using albuterol around the clock.        REVIEW OF SYSTEMS:  Neg unless noted above    T(C): 36.8 (01-24-21 @ 15:22), Max: 37.2 (01-24-21 @ 06:09)  HR: 87 (01-24-21 @ 15:22) (87 - 91)  BP: 135/78 (01-24-21 @ 15:22) (120/82 - 135/78)  RR: 19 (01-24-21 @ 15:22) (19 - 22)  SpO2: 94% (01-24-21 @ 15:22) (93% - 94%)  Wt(kg): --Vital Signs Last 24 Hrs  T(C): 36.8 (24 Jan 2021 15:22), Max: 37.2 (24 Jan 2021 06:09)  T(F): 98.2 (24 Jan 2021 15:22), Max: 99 (24 Jan 2021 06:09)  HR: 87 (24 Jan 2021 15:22) (87 - 91)  BP: 135/78 (24 Jan 2021 15:22) (120/82 - 135/78)  BP(mean): --  RR: 19 (24 Jan 2021 15:22) (19 - 22)  SpO2: 94% (24 Jan 2021 15:22) (93% - 94%)    PHYSICAL EXAM:  PHYSICAL EXAM:  GENERAL: NAD, Obese female  CHEST/LUNG: Tachypenic, Decreased bs bilaterally; No wheeze  HEART: Regular rate and rhythm; No murmurs  ABDOMEN: Soft, Nontender, Nondistended  EXTREMITIES: no LE edema  PSYCH: Calm  NEUROLOGY: AAOx3  SKIN: No rashes or lesions      Consultant(s) Notes Reviewed:  [x ] YES  [ ] NO  Care Discussed with Consultants/Other Providers [ x] YES  [ ] NO    LABS:                        11.8   6.24  )-----------( 227      ( 24 Jan 2021 07:36 )             40.3     01-23    139  |  105  |  11  ----------------------------<  112<H>  4.3   |  22  |  0.60    Ca    8.7      23 Jan 2021 07:20    TPro  6.9  /  Alb  3.3  /  TBili  0.2  /  DBili  x   /  AST  26  /  ALT  49<H>  /  AlkPhos  49  01-23        CAPILLARY BLOOD GLUCOSE                RADIOLOGY & ADDITIONAL TESTS:    Imaging Personally Reviewed:  [ ] YES  [ ] NO

## 2021-01-24 NOTE — PROGRESS NOTE ADULT - PROBLEM SELECTOR PLAN 5
- elevated LFTs likely 2/2 COVID  - trend LFTs  - monitor

## 2021-01-24 NOTE — PROVIDER CONTACT NOTE (OTHER) - ACTION/TREATMENT ORDERED:
Provider notified, will advance to non rebreather at 10L and monitor,
LINDA SAAVEDRA notified. Okay to give Tylenol PO. Will continue to monitor.

## 2021-01-24 NOTE — PROVIDER CONTACT NOTE (OTHER) - SITUATION
pt with worsening dyspnea on NC 6L, saturation 87, s/p albuterol inhaler and symbicort
Patient temp 100.0 oral,

## 2021-01-24 NOTE — PROGRESS NOTE ADULT - PROBLEM SELECTOR PLAN 2
- acute on chronic asthma exacerbation likely 2/2 COVID19 Infection   - Wheezing + Shortness of breath + Cough  - s/p 125mg Solumedrol, cont decadron. Wheezing resolved  - continue home inhalers  - Albuterol PRN  - O2 PRN

## 2021-01-25 DIAGNOSIS — J45.41 MODERATE PERSISTENT ASTHMA WITH (ACUTE) EXACERBATION: ICD-10-CM

## 2021-01-25 LAB
ALBUMIN SERPL ELPH-MCNC: 3.3 G/DL — SIGNIFICANT CHANGE UP (ref 3.3–5)
ALBUMIN SERPL ELPH-MCNC: 3.4 G/DL — SIGNIFICANT CHANGE UP (ref 3.3–5)
ALP SERPL-CCNC: 48 U/L — SIGNIFICANT CHANGE UP (ref 40–120)
ALP SERPL-CCNC: 49 U/L — SIGNIFICANT CHANGE UP (ref 40–120)
ALT FLD-CCNC: 55 U/L — HIGH (ref 4–33)
ALT FLD-CCNC: 55 U/L — HIGH (ref 4–33)
ANION GAP SERPL CALC-SCNC: 12 MMOL/L — SIGNIFICANT CHANGE UP (ref 7–14)
AST SERPL-CCNC: 17 U/L — SIGNIFICANT CHANGE UP (ref 4–32)
AST SERPL-CCNC: 18 U/L — SIGNIFICANT CHANGE UP (ref 4–32)
BASOPHILS # BLD AUTO: 0.02 K/UL — SIGNIFICANT CHANGE UP (ref 0–0.2)
BASOPHILS NFR BLD AUTO: 0.3 % — SIGNIFICANT CHANGE UP (ref 0–2)
BILIRUB DIRECT SERPL-MCNC: <0.2 MG/DL — SIGNIFICANT CHANGE UP (ref 0–0.2)
BILIRUB INDIRECT FLD-MCNC: >0 MG/DL — SIGNIFICANT CHANGE UP (ref 0–1)
BILIRUB SERPL-MCNC: 0.2 MG/DL — SIGNIFICANT CHANGE UP (ref 0.2–1.2)
BILIRUB SERPL-MCNC: 0.3 MG/DL — SIGNIFICANT CHANGE UP (ref 0.2–1.2)
BUN SERPL-MCNC: 14 MG/DL — SIGNIFICANT CHANGE UP (ref 7–23)
CALCIUM SERPL-MCNC: 8.8 MG/DL — SIGNIFICANT CHANGE UP (ref 8.4–10.5)
CHLORIDE SERPL-SCNC: 107 MMOL/L — SIGNIFICANT CHANGE UP (ref 98–107)
CO2 SERPL-SCNC: 22 MMOL/L — SIGNIFICANT CHANGE UP (ref 22–31)
CREAT SERPL-MCNC: 0.52 MG/DL — SIGNIFICANT CHANGE UP (ref 0.5–1.3)
CREAT SERPL-MCNC: 0.52 MG/DL — SIGNIFICANT CHANGE UP (ref 0.5–1.3)
EOSINOPHIL # BLD AUTO: 0.02 K/UL — SIGNIFICANT CHANGE UP (ref 0–0.5)
EOSINOPHIL NFR BLD AUTO: 0.3 % — SIGNIFICANT CHANGE UP (ref 0–6)
GLUCOSE SERPL-MCNC: 124 MG/DL — HIGH (ref 70–99)
HCT VFR BLD CALC: 39.5 % — SIGNIFICANT CHANGE UP (ref 34.5–45)
HGB BLD-MCNC: 12.2 G/DL — SIGNIFICANT CHANGE UP (ref 11.5–15.5)
IANC: 3.89 K/UL — SIGNIFICANT CHANGE UP (ref 1.5–8.5)
IMM GRANULOCYTES NFR BLD AUTO: 0.9 % — SIGNIFICANT CHANGE UP (ref 0–1.5)
LYMPHOCYTES # BLD AUTO: 1.94 K/UL — SIGNIFICANT CHANGE UP (ref 1–3.3)
LYMPHOCYTES # BLD AUTO: 29.3 % — SIGNIFICANT CHANGE UP (ref 13–44)
MAGNESIUM SERPL-MCNC: 2 MG/DL — SIGNIFICANT CHANGE UP (ref 1.6–2.6)
MCHC RBC-ENTMCNC: 24.5 PG — LOW (ref 27–34)
MCHC RBC-ENTMCNC: 30.9 GM/DL — LOW (ref 32–36)
MCV RBC AUTO: 79.5 FL — LOW (ref 80–100)
MONOCYTES # BLD AUTO: 0.68 K/UL — SIGNIFICANT CHANGE UP (ref 0–0.9)
MONOCYTES NFR BLD AUTO: 10.3 % — SIGNIFICANT CHANGE UP (ref 2–14)
NEUTROPHILS # BLD AUTO: 3.89 K/UL — SIGNIFICANT CHANGE UP (ref 1.8–7.4)
NEUTROPHILS NFR BLD AUTO: 58.9 % — SIGNIFICANT CHANGE UP (ref 43–77)
NRBC # BLD: 0 /100 WBCS — SIGNIFICANT CHANGE UP
NRBC # FLD: 0 K/UL — SIGNIFICANT CHANGE UP
PHOSPHATE SERPL-MCNC: 3.9 MG/DL — SIGNIFICANT CHANGE UP (ref 2.5–4.5)
PLATELET # BLD AUTO: 247 K/UL — SIGNIFICANT CHANGE UP (ref 150–400)
POTASSIUM SERPL-MCNC: 4 MMOL/L — SIGNIFICANT CHANGE UP (ref 3.5–5.3)
POTASSIUM SERPL-SCNC: 4 MMOL/L — SIGNIFICANT CHANGE UP (ref 3.5–5.3)
PROT SERPL-MCNC: 6.9 G/DL — SIGNIFICANT CHANGE UP (ref 6–8.3)
PROT SERPL-MCNC: 6.9 G/DL — SIGNIFICANT CHANGE UP (ref 6–8.3)
RBC # BLD: 4.97 M/UL — SIGNIFICANT CHANGE UP (ref 3.8–5.2)
RBC # FLD: 14.2 % — SIGNIFICANT CHANGE UP (ref 10.3–14.5)
SODIUM SERPL-SCNC: 141 MMOL/L — SIGNIFICANT CHANGE UP (ref 135–145)
WBC # BLD: 6.61 K/UL — SIGNIFICANT CHANGE UP (ref 3.8–10.5)
WBC # FLD AUTO: 6.61 K/UL — SIGNIFICANT CHANGE UP (ref 3.8–10.5)

## 2021-01-25 PROCEDURE — 99232 SBSQ HOSP IP/OBS MODERATE 35: CPT

## 2021-01-25 RX ADMIN — Medication 100 MILLIGRAM(S): at 22:12

## 2021-01-25 RX ADMIN — Medication 100 MILLIGRAM(S): at 06:21

## 2021-01-25 RX ADMIN — ALBUTEROL 2 PUFF(S): 90 AEROSOL, METERED ORAL at 09:30

## 2021-01-25 RX ADMIN — BUDESONIDE AND FORMOTEROL FUMARATE DIHYDRATE 2 PUFF(S): 160; 4.5 AEROSOL RESPIRATORY (INHALATION) at 22:14

## 2021-01-25 RX ADMIN — ENOXAPARIN SODIUM 40 MILLIGRAM(S): 100 INJECTION SUBCUTANEOUS at 22:08

## 2021-01-25 RX ADMIN — REMDESIVIR 500 MILLIGRAM(S): 5 INJECTION INTRAVENOUS at 15:59

## 2021-01-25 RX ADMIN — Medication 100 MILLIGRAM(S): at 06:22

## 2021-01-25 RX ADMIN — ENOXAPARIN SODIUM 40 MILLIGRAM(S): 100 INJECTION SUBCUTANEOUS at 11:51

## 2021-01-25 RX ADMIN — Medication 6 MILLIGRAM(S): at 06:04

## 2021-01-25 RX ADMIN — BUDESONIDE AND FORMOTEROL FUMARATE DIHYDRATE 2 PUFF(S): 160; 4.5 AEROSOL RESPIRATORY (INHALATION) at 11:51

## 2021-01-25 RX ADMIN — PANTOPRAZOLE SODIUM 40 MILLIGRAM(S): 20 TABLET, DELAYED RELEASE ORAL at 06:05

## 2021-01-25 RX ADMIN — Medication 100 MILLIGRAM(S): at 18:36

## 2021-01-25 NOTE — PROGRESS NOTE ADULT - PROBLEM SELECTOR PLAN 2
-patient's CXR had shown bilateral opacities  -continue course of Remdesivir (last dose today) and complete course of Dexamethasone  -remains on 3L of oxygen via nasal canula  -AST/ALT are within normal limits

## 2021-01-25 NOTE — PROGRESS NOTE ADULT - PROBLEM SELECTOR PLAN 1
-patient continues to feel dyspneic and wheezy  -continue Dexamethasone and Albuterol MDI. Continue Symbicort

## 2021-01-25 NOTE — PROGRESS NOTE ADULT - SUBJECTIVE AND OBJECTIVE BOX
SUBJECTIVE / OVERNIGHT EVENTS:    Patient continues to report feeling short of breath, particularly with exertion. She reports feeling wheezy still and has tightness in her chest.    REVIEW OF SYSTEMS:  CONSTITUTIONAL: No fevers or chills  EYES/ENT: No visual changes;  No vertigo or throat pain   NECK: No pain or stiffness  RESPIRATORY: (+) cough, wheezes, and dyspnea  CARDIOVASCULAR: No chest pain or palpitations  GASTROINTESTINAL: No abdominal or epigastric pain. No nausea, vomiting, or hematemesis; No diarrhea or constipation. No melena or hematochezia.  GENITOURINARY: No dysuria, frequency or hematuria  NEUROLOGICAL: No numbness or weakness  SKIN: No itching, rashes      MEDICATIONS  (STANDING):  budesonide  80 MICROgram(s)/formoterol 4.5 MICROgram(s) Inhaler 2 Puff(s) Inhalation two times a day  dexAMETHasone     Tablet 6 milliGRAM(s) Oral daily  enoxaparin Injectable 40 milliGRAM(s) SubCutaneous every 12 hours  remdesivir  IVPB 100 milliGRAM(s) IV Intermittent every 24 hours  remdesivir  IVPB   IV Intermittent     MEDICATIONS  (PRN):  acetaminophen   Tablet .. 650 milliGRAM(s) Oral every 6 hours PRN Temp greater or equal to 38C (100.4F)  ALBUTerol    90 MICROgram(s) HFA Inhaler 2 Puff(s) Inhalation every 4 hours PRN Shortness of Breath and/or Wheezing  benzonatate 100 milliGRAM(s) Oral three times a day PRN Cough  guaiFENesin   Syrup  (Sugar-Free) 100 milliGRAM(s) Oral every 6 hours PRN Cough      CAPILLARY BLOOD GLUCOSE        I&O's Summary      PHYSICAL EXAM:  Vital Signs Last 24 Hrs  T(C): 36.4 (25 Jan 2021 11:47), Max: 36.9 (25 Jan 2021 02:00)  T(F): 97.6 (25 Jan 2021 11:47), Max: 98.4 (25 Jan 2021 02:00)  HR: 83 (25 Jan 2021 11:47) (83 - 84)  BP: 101/60 (25 Jan 2021 11:47) (101/60 - 136/75)  BP(mean): --  RR: 19 (25 Jan 2021 11:47) (18 - 19)  SpO2: 95% (25 Jan 2021 11:47) (95% - 95%)  CONSTITUTIONAL: no acute distress  EYES: PERRLA; conjunctiva and sclera clear  ENMT: Moist oral mucosa, no pharyngeal injection or exudates; normal dentition  NECK: Supple, no palpable masses; no thyromegaly  RESPIRATORY: No accessory muscle use. Wheezes heard bilaterally  CARDIOVASCULAR: Regular rate and rhythm, normal S1 and S2, no murmur/rub/gallop; No lower extremity edema; Peripheral pulses are 2+ bilaterally  ABDOMEN: Nontender to palpation, normoactive bowel sounds, no rebound/guarding; No hepatosplenomegaly  MUSCULOSKELETAL:  no clubbing or cyanosis of digits; no joint swelling or tenderness to palpation  PSYCH: A+O to person, place, and time; affect appropriate  NEUROLOGY: CN 2-12 are intact and symmetric; no gross motor or  sensory deficits   SKIN: No rashes; no palpable lesions    LABS:                        12.2   6.61  )-----------( 247      ( 25 Jan 2021 07:25 )             39.5     01-25    141  |  107  |  14  ----------------------------<  124<H>  4.0   |  22  |  0.52    Ca    8.8      25 Jan 2021 07:25  Phos  3.9     01-25  Mg     2.0     01-25    TPro  6.9  /  Alb  3.3  /  TBili  0.2  /  DBili  <0.2  /  AST  18  /  ALT  55<H>  /  AlkPhos  48  01-25                RADIOLOGY & ADDITIONAL TESTS:  Results Reviewed:   Imaging Personally Reviewed:  Electrocardiogram Personally Reviewed:    COORDINATION OF CARE:  Care Discussed with Consultants/Other Providers [Y/N]:  Prior or Outpatient Records Reviewed [Y/N]:

## 2021-01-26 LAB
ALBUMIN SERPL ELPH-MCNC: 3.3 G/DL — SIGNIFICANT CHANGE UP (ref 3.3–5)
ALP SERPL-CCNC: 48 U/L — SIGNIFICANT CHANGE UP (ref 40–120)
ALT FLD-CCNC: 44 U/L — HIGH (ref 4–33)
ANION GAP SERPL CALC-SCNC: 13 MMOL/L — SIGNIFICANT CHANGE UP (ref 7–14)
AST SERPL-CCNC: 16 U/L — SIGNIFICANT CHANGE UP (ref 4–32)
BASOPHILS # BLD AUTO: 0 K/UL — SIGNIFICANT CHANGE UP (ref 0–0.2)
BASOPHILS NFR BLD AUTO: 0 % — SIGNIFICANT CHANGE UP (ref 0–2)
BILIRUB SERPL-MCNC: 0.2 MG/DL — SIGNIFICANT CHANGE UP (ref 0.2–1.2)
BUN SERPL-MCNC: 15 MG/DL — SIGNIFICANT CHANGE UP (ref 7–23)
CALCIUM SERPL-MCNC: 9.4 MG/DL — SIGNIFICANT CHANGE UP (ref 8.4–10.5)
CHLORIDE SERPL-SCNC: 104 MMOL/L — SIGNIFICANT CHANGE UP (ref 98–107)
CO2 SERPL-SCNC: 24 MMOL/L — SIGNIFICANT CHANGE UP (ref 22–31)
CREAT SERPL-MCNC: 0.54 MG/DL — SIGNIFICANT CHANGE UP (ref 0.5–1.3)
CRP SERPL-MCNC: 6.4 MG/L — HIGH
D DIMER BLD IA.RAPID-MCNC: 183 NG/ML DDU — HIGH
EOSINOPHIL # BLD AUTO: 0 K/UL — SIGNIFICANT CHANGE UP (ref 0–0.5)
EOSINOPHIL NFR BLD AUTO: 0 % — SIGNIFICANT CHANGE UP (ref 0–6)
FERRITIN SERPL-MCNC: 215 NG/ML — HIGH (ref 15–150)
GLUCOSE SERPL-MCNC: 126 MG/DL — HIGH (ref 70–99)
HCT VFR BLD CALC: 40.9 % — SIGNIFICANT CHANGE UP (ref 34.5–45)
HGB BLD-MCNC: 12.2 G/DL — SIGNIFICANT CHANGE UP (ref 11.5–15.5)
IANC: 4.27 K/UL — SIGNIFICANT CHANGE UP (ref 1.5–8.5)
LDH SERPL L TO P-CCNC: 306 U/L — HIGH (ref 135–225)
LYMPHOCYTES # BLD AUTO: 1.41 K/UL — SIGNIFICANT CHANGE UP (ref 1–3.3)
LYMPHOCYTES # BLD AUTO: 19.9 % — SIGNIFICANT CHANGE UP (ref 13–44)
MANUAL SMEAR VERIFICATION: SIGNIFICANT CHANGE UP
MCHC RBC-ENTMCNC: 24.7 PG — LOW (ref 27–34)
MCHC RBC-ENTMCNC: 29.8 GM/DL — LOW (ref 32–36)
MCV RBC AUTO: 83 FL — SIGNIFICANT CHANGE UP (ref 80–100)
MONOCYTES # BLD AUTO: 0.12 K/UL — SIGNIFICANT CHANGE UP (ref 0–0.9)
MONOCYTES NFR BLD AUTO: 1.7 % — LOW (ref 2–14)
NEUTROPHILS # BLD AUTO: 5.08 K/UL — SIGNIFICANT CHANGE UP (ref 1.8–7.4)
NEUTROPHILS NFR BLD AUTO: 69.8 % — SIGNIFICANT CHANGE UP (ref 43–77)
NEUTS BAND # BLD: 1.7 % — SIGNIFICANT CHANGE UP (ref 0–6)
PLAT MORPH BLD: NORMAL — SIGNIFICANT CHANGE UP
PLATELET # BLD AUTO: 283 K/UL — SIGNIFICANT CHANGE UP (ref 150–400)
PLATELET COUNT - ESTIMATE: NORMAL — SIGNIFICANT CHANGE UP
POTASSIUM SERPL-MCNC: 4.3 MMOL/L — SIGNIFICANT CHANGE UP (ref 3.5–5.3)
POTASSIUM SERPL-SCNC: 4.3 MMOL/L — SIGNIFICANT CHANGE UP (ref 3.5–5.3)
PROCALCITONIN SERPL-MCNC: 0.06 NG/ML — SIGNIFICANT CHANGE UP (ref 0.02–0.1)
PROT SERPL-MCNC: 7.2 G/DL — SIGNIFICANT CHANGE UP (ref 6–8.3)
RBC # BLD: 4.93 M/UL — SIGNIFICANT CHANGE UP (ref 3.8–5.2)
RBC # FLD: 13.9 % — SIGNIFICANT CHANGE UP (ref 10.3–14.5)
RBC BLD AUTO: NORMAL — SIGNIFICANT CHANGE UP
SODIUM SERPL-SCNC: 141 MMOL/L — SIGNIFICANT CHANGE UP (ref 135–145)
VARIANT LYMPHS # BLD: 6.9 % — HIGH (ref 0–6)
WBC # BLD: 7.11 K/UL — SIGNIFICANT CHANGE UP (ref 3.8–10.5)
WBC # FLD AUTO: 7.11 K/UL — SIGNIFICANT CHANGE UP (ref 3.8–10.5)

## 2021-01-26 PROCEDURE — 99233 SBSQ HOSP IP/OBS HIGH 50: CPT

## 2021-01-26 PROCEDURE — 99222 1ST HOSP IP/OBS MODERATE 55: CPT | Mod: GC

## 2021-01-26 PROCEDURE — 93970 EXTREMITY STUDY: CPT | Mod: 26

## 2021-01-26 RX ORDER — DEXAMETHASONE 0.5 MG/5ML
6 ELIXIR ORAL DAILY
Refills: 0 | Status: COMPLETED | OUTPATIENT
Start: 2021-01-26 | End: 2021-01-31

## 2021-01-26 RX ORDER — BUDESONIDE AND FORMOTEROL FUMARATE DIHYDRATE 160; 4.5 UG/1; UG/1
2 AEROSOL RESPIRATORY (INHALATION)
Refills: 0 | Status: DISCONTINUED | OUTPATIENT
Start: 2021-01-26 | End: 2021-01-31

## 2021-01-26 RX ORDER — ALBUTEROL 90 UG/1
2 AEROSOL, METERED ORAL EVERY 6 HOURS
Refills: 0 | Status: DISCONTINUED | OUTPATIENT
Start: 2021-01-26 | End: 2021-01-31

## 2021-01-26 RX ADMIN — Medication 1 DROP(S): at 22:43

## 2021-01-26 RX ADMIN — ALBUTEROL 2 PUFF(S): 90 AEROSOL, METERED ORAL at 22:30

## 2021-01-26 RX ADMIN — Medication 6 MILLIGRAM(S): at 14:32

## 2021-01-26 RX ADMIN — Medication 100 MILLIGRAM(S): at 14:36

## 2021-01-26 RX ADMIN — Medication 6 MILLIGRAM(S): at 07:09

## 2021-01-26 RX ADMIN — Medication 100 MILLIGRAM(S): at 07:08

## 2021-01-26 RX ADMIN — ENOXAPARIN SODIUM 40 MILLIGRAM(S): 100 INJECTION SUBCUTANEOUS at 09:53

## 2021-01-26 RX ADMIN — ALBUTEROL 2 PUFF(S): 90 AEROSOL, METERED ORAL at 16:00

## 2021-01-26 RX ADMIN — ENOXAPARIN SODIUM 40 MILLIGRAM(S): 100 INJECTION SUBCUTANEOUS at 22:31

## 2021-01-26 RX ADMIN — Medication 100 MILLIGRAM(S): at 22:39

## 2021-01-26 NOTE — PROGRESS NOTE ADULT - SUBJECTIVE AND OBJECTIVE BOX
Medicine Progress Note    SUBJECTIVE / OVERNIGHT EVENTS:    Patient states that she feels like her dyspnea and wheezing haven't improved. Also reports a cough. She requested promethazine as she states that she takes it home as needed when she gets exacerbations and that it tends to help her.   Also reports bilateral lower extremy pain.    ROS:    CONSTITUTIONAL: No fevers or chills  EYES/ENT: No visual changes;  No vertigo or throat pain   NECK: No pain or stiffness  RESPIRATORY: (+) cough, wheezes, and dyspnea  CARDIOVASCULAR: No chest pain or palpitations  GASTROINTESTINAL: No abdominal or epigastric pain. No nausea, vomiting, or hematemesis; No diarrhea or constipation. No melena or hematochezia.  GENITOURINARY: No dysuria, frequency or hematuria  NEUROLOGICAL: No numbness or weakness  SKIN: No itching, rashes      MEDICATIONS  (STANDING):  ALBUTerol    90 MICROgram(s) HFA Inhaler 2 Puff(s) Inhalation every 6 hours  budesonide 160 MICROgram(s)/formoterol 4.5 MICROgram(s) Inhaler 2 Puff(s) Inhalation two times a day  dexAMETHasone  Injectable 6 milliGRAM(s) IV Push daily  enoxaparin Injectable 40 milliGRAM(s) SubCutaneous every 12 hours    MEDICATIONS  (PRN):  acetaminophen   Tablet .. 650 milliGRAM(s) Oral every 6 hours PRN Temp greater or equal to 38C (100.4F)  artificial tears (preservative free) Ophthalmic Solution 1 Drop(s) Both EYES four times a day PRN Dry Eyes  benzonatate 100 milliGRAM(s) Oral three times a day PRN Cough  guaiFENesin   Syrup  (Sugar-Free) 100 milliGRAM(s) Oral every 6 hours PRN Cough  promethazine 25 milliGRAM(s) Oral every 8 hours PRN nausea, cough    CAPILLARY BLOOD GLUCOSE        I&O's Summary      PHYSICAL EXAM:  Vital Signs Last 24 Hrs  T(C): 35.2 (26 Jan 2021 09:35), Max: 36.6 (25 Jan 2021 22:07)  T(F): 95.3 (26 Jan 2021 09:35), Max: 97.8 (25 Jan 2021 22:07)  HR: 82 (26 Jan 2021 09:35) (72 - 82)  BP: 111/65 (26 Jan 2021 09:35) (105/62 - 111/65)  BP(mean): --  RR: 20 (26 Jan 2021 09:35) (18 - 20)  SpO2: 95% (26 Jan 2021 09:35) (95% - 96%)  CONSTITUTIONAL: NAD, well-developed  ENMT: Moist oral mucosa, no pharyngeal injection or exudates; normal dentition  RESPIRATORY: No accessory muscle use. Wheezes heard bilaterally  CARDIOVASCULAR: Regular rate and rhythm, normal S1 and S2, no murmur/rub/gallop; No lower extremity edema; Peripheral pulses are 2+ bilaterally  ABDOMEN: Nontender to palpation, normoactive bowel sounds, no rebound/guarding; No hepatosplenomegaly  PSYCH: A+O to person, place, and time; affect appropriate  NEUROLOGY: CN 2-12 are intact and symmetric; no gross sensory deficits   SKIN: No rashes; no palpable lesions    LABS:                        12.2   7.11  )-----------( 283      ( 26 Jan 2021 07:29 )             40.9     01-26    141  |  104  |  15  ----------------------------<  126<H>  4.3   |  24  |  0.54    Ca    9.4      26 Jan 2021 07:29  Phos  3.9     01-25  Mg     2.0     01-25    TPro  7.2  /  Alb  3.3  /  TBili  0.2  /  DBili  x   /  AST  16  /  ALT  44<H>  /  AlkPhos  48  01-26              SARS-CoV-2: Detected (19 Jan 2021 19:34)      RADIOLOGY & ADDITIONAL TESTS:  Imaging from Last 24 Hours:    Electrocardiogram/QTc Interval:    COORDINATION OF CARE:  Care Discussed with Consultants/Other Providers:

## 2021-01-26 NOTE — PROGRESS NOTE ADULT - PROBLEM SELECTOR PLAN 1
-patient states she feels like she's not improving  -given lack of improvement in her symptoms can consult pulmonary for further recommendations  -continue Dexamethasone given concomitant COVID, but given slow progression can change from oral to IV  -Albuterol MDI changed to standing. Can give Symbicort as well.  -Promethazine added as per patient request

## 2021-01-26 NOTE — CHART NOTE - NSCHARTNOTEFT_GEN_A_CORE
pulmonary consult called - symbicort dose increased, albuterol changed to standing Q 6hrs, Decadron changed to IV
Called by nurse for pt having spasm to neck and headache 2/2 positioning. She states pain is 8/10 and Tylenol does not help. The pain increases with movement. Will order one dose of percocet and follow. She also c/o diarrhea and requests Robitussin for cough and complains of diarrhea. Will follow for episodes of diarrhea

## 2021-01-26 NOTE — CONSULT NOTE ADULT - ASSESSMENT
30F with obesity and intermittent asthma presented for progressive shortness of breath x3 days week along with dry cough, fevers, and malaise after testing positive for COVID-19 at urgent care, admitted for hypoxemia due to COVID-19 now requiring 2L O2 and saturating 96%. D-dimer, procalcitonin unremarkable, CRP donwtrended from 79 to 6. She completed remdesivir and is on dexamethasone. Pulmonology consulted as per pt request for asthma, which is currently managed by her PCP.     Problems:  Hypoxemia due to COVID-19  Asthma    Recommendations  - Increase symbicort to 160/4.5 2 puffs BID, prescribe on dc  - Albuterol q6h standing for now, can make PRN once pt feeling better, prescribe prn on dc  - Incentive spirometry  - Complete standard 10 day course of dexamethasone 6 mg  - Wean off O2 if possible, maintain O2 saturation > 92%, ambulate off O2 as getting closer to discharge to see if needs home O2  - Patient can follow up with us outpatient at [address] [place] [address], [address]. Number [phone]. Appointments can also be made by e-mailing [email or web address] and providing patient's name, , and discharge diagnosis  - Will sign off, please call with further questions    Yoseph Mcdonough MD  Fellow, Pulmonary and Critical Care Medicine  [facility]  Pager: [phone], [phone] ([facility])  Email: [email or web address]

## 2021-01-26 NOTE — CONSULT NOTE ADULT - ATTENDING COMMENTS
30F PMH Obesity and asthma who presents with COVID-19 infection with hypoxemia requiring nasal cannula. S/p remdesevir. Complete 10-day course of dexamethasone. Agree with Symbicort 160-4.5 mcg 2 puffs bid, rinse after use. ALbuterol inhaler prn. Keep Supp O2 with NC, goal SpO2>90%. Outpatient pulm follow-up for asthma evaluation. Will sign off, please call back with questions.

## 2021-01-26 NOTE — CONSULT NOTE ADULT - SUBJECTIVE AND OBJECTIVE BOX
CHIEF COMPLAINT:    HPI:    PAST MEDICAL & SURGICAL HISTORY:  Asthma    No pertinent past medical history    No significant past surgical history        FAMILY HISTORY:  No pertinent family history in first degree relatives        SOCIAL HISTORY:  Smoking: [ ] Never Smoked [ ] Former Smoker (__ packs x ___ years) [ ] Current Smoker  (__ packs x ___ years)  Substance Use: [ ] Never Used [ ] Used ____  EtOH Use:  Marital Status: [ ] Single [ ]  [ ]  [ ]   Sexual History:   Occupation:  Recent Travel:  Country of Birth:  Advance Directives:    Allergies    No Known Allergies    Intolerances        HOME MEDICATIONS:  Home Medications:  albuterol 90 mcg/inh inhalation aerosol: 2 puff(s) inhaled every 4 hours, As needed, Shortness of Breath and/or Wheezing (22 Jan 2021 08:46)  Symbicort 80 mcg-4.5 mcg/inh inhalation aerosol: 2 puff(s) inhaled 2 times a day (19 Jan 2021 22:28)      REVIEW OF SYSTEMS:  Constitutional: [ ] negative [ ] fevers [ ] chills [ ] weight loss [ ] weight gain [ ] malaise  HEENT: [ ] negative [ ] visual disturbances [ ] postnasal drip [ ] nasal congestion [ ] sore throat  CV: [ ] negative [ ] chest pain [ ] palpitations [ ] orthopnea   Resp: [ ] negative [ ] cough [ ] shortness of breath [ ] wheezing [ ] sputum [ ] hemoptysis  GI: [ ] negative [ ] nausea [ ] vomiting [ ] abdominal pain [ ] dysphagia [ ] diarrhea [ ] melena [ ] hematochezia  : [ ] negative [ ] dysuria [ ] nocturia [ ] hematuria [ ] increased urinary frequency  Musculoskeletal: [ ] negative [ ] back pain [ ] myalgias [ ] arthralgias [ ] fracture  Skin: [ ] negative [ ] rash [ ] pruritus  Neurological: [ ] negative [ ] headache [ ] dizziness [ ] syncope [ ] weakness [ ] numbness  Psychiatric: [ ] negative [ ] anxiety [ ] depression  Endocrine: [ ] negative [ ] diabetes [ ] thyroid problem  Hematologic/Lymphatic: [ ] negative [ ] anemia [ ] bleeding problem  Allergic/Immunologic: [ ] hives [ ] angioedema  [ ] All other systems negative  [ ] Unable to assess ROS because ________    OBJECTIVE:  ICU Vital Signs Last 24 Hrs  T(C): 35.2 (26 Jan 2021 09:35), Max: 36.6 (25 Jan 2021 22:07)  T(F): 95.3 (26 Jan 2021 09:35), Max: 97.8 (25 Jan 2021 22:07)  HR: 82 (26 Jan 2021 09:35) (72 - 82)  BP: 111/65 (26 Jan 2021 09:35) (105/62 - 111/65)  BP(mean): --  ABP: --  ABP(mean): --  RR: 20 (26 Jan 2021 09:35) (18 - 20)  SpO2: 95% (26 Jan 2021 09:35) (95% - 96%)        CAPILLARY BLOOD GLUCOSE          PHYSICAL EXAM:  General:   HEENT:   Lymph Nodes:  Neck:   Respiratory:   Cardiovascular:   Abdomen:   Extremities:   Skin:   Neurological:  Psychiatry:    LINES:     HOSPITAL MEDICATIONS:  Standing Meds:  ALBUTerol    90 MICROgram(s) HFA Inhaler 2 Puff(s) Inhalation every 6 hours  budesonide 160 MICROgram(s)/formoterol 4.5 MICROgram(s) Inhaler 2 Puff(s) Inhalation two times a day  dexAMETHasone  Injectable 6 milliGRAM(s) IV Push daily  enoxaparin Injectable 40 milliGRAM(s) SubCutaneous every 12 hours      PRN Meds:  acetaminophen   Tablet .. 650 milliGRAM(s) Oral every 6 hours PRN  artificial tears (preservative free) Ophthalmic Solution 1 Drop(s) Both EYES four times a day PRN  benzonatate 100 milliGRAM(s) Oral three times a day PRN  guaiFENesin   Syrup  (Sugar-Free) 100 milliGRAM(s) Oral every 6 hours PRN  promethazine 25 milliGRAM(s) Oral every 8 hours PRN      LABS:                        12.2   7.11  )-----------( 283      ( 26 Jan 2021 07:29 )             40.9     Hgb Trend: 12.2<--, 12.2<--, 11.8<--, 11.3<--, 11.6<--  01-26    141  |  104  |  15  ----------------------------<  126<H>  4.3   |  24  |  0.54    Ca    9.4      26 Jan 2021 07:29  Phos  3.9     01-25  Mg     2.0     01-25    TPro  7.2  /  Alb  3.3  /  TBili  0.2  /  DBili  x   /  AST  16  /  ALT  44<H>  /  AlkPhos  48  01-26    Creatinine Trend: 0.54<--, 0.52<--, 0.60<--, 0.57<--, 0.59<--, 0.78<--            MICROBIOLOGY:       RADIOLOGY:  [ ] Reviewed and interpreted by me    PULMONARY FUNCTION TESTS:    EKG: CHIEF COMPLAINT: Shortness of breath     HPI: 30F with obesity and intermittent asthma presented for progressive shortness of breath x3 days week along with dry cough, fevers, and malaise after testing positive for COVID-19 at urgent care, admitted for hypoxemia due to COVID-19 now requiring 2L O2 and saturating 96%. D-dimer, procalcitonin unremarkable, CRP donwtrended from 79 to 6. She completed remdesivir and is on dexamethasone. Pulmonology consulted as per pt request for asthma, which is currently managed by her PCP.     PAST MEDICAL & SURGICAL HISTORY:  Asthma    No pertinent past medical history    No significant past surgical history    FAMILY HISTORY:  No pertinent family history in first degree relatives      SOCIAL HISTORY:  Smoking: [x ] Never Smoked [ ] Former Smoker (__ packs x ___ years) [ ] Current Smoker  (__ packs x ___ years)  Substance Use: [ x] Never Used [ ] Used ____  EtOH Use: none  Marital Status: [ ] Single [ ]  [ ]  [ ]   Sexual History:   Occupation:  Recent Travel:  Country of Birth:  Advance Directives:    Allergies    No Known Allergies    Intolerances        HOME MEDICATIONS:  Home Medications:  albuterol 90 mcg/inh inhalation aerosol: 2 puff(s) inhaled every 4 hours, As needed, Shortness of Breath and/or Wheezing (22 Jan 2021 08:46)  Symbicort 80 mcg-4.5 mcg/inh inhalation aerosol: 2 puff(s) inhaled 2 times a day (19 Jan 2021 22:28)      REVIEW OF SYSTEMS:  Constitutional: [ ] negative [ ] fevers [ ] chills [ ] weight loss [ ] weight gain [ ] malaise  HEENT: [ ] negative [ ] visual disturbances [ ] postnasal drip [ ] nasal congestion [ ] sore throat  CV: [ ] negative [ ] chest pain [ ] palpitations [ ] orthopnea   Resp: [ ] negative [x ] cough [ x] shortness of breath [ ] wheezing [ ] sputum [ ] hemoptysis  GI: [ ] negative [ ] nausea [ ] vomiting [ ] abdominal pain [ ] dysphagia [ ] diarrhea [ ] melena [ ] hematochezia  : [ ] negative [ ] dysuria [ ] nocturia [ ] hematuria [ ] increased urinary frequency  Musculoskeletal: [ ] negative [ ] back pain [ ] myalgias [ ] arthralgias [ ] fracture  Skin: [ ] negative [ ] rash [ ] pruritus  Neurological: [ ] negative [ ] headache [ ] dizziness [ ] syncope [ ] weakness [ ] numbness  Psychiatric: [ ] negative [ ] anxiety [ ] depression  Endocrine: [ ] negative [ ] diabetes [ ] thyroid problem  Hematologic/Lymphatic: [ ] negative [ ] anemia [ ] bleeding problem  Allergic/Immunologic: [ ] hives [ ] angioedema  [ ] All other systems negative  [ ] Unable to assess ROS because ________    OBJECTIVE:  ICU Vital Signs Last 24 Hrs  T(C): 35.2 (26 Jan 2021 09:35), Max: 36.6 (25 Jan 2021 22:07)  T(F): 95.3 (26 Jan 2021 09:35), Max: 97.8 (25 Jan 2021 22:07)  HR: 82 (26 Jan 2021 09:35) (72 - 82)  BP: 111/65 (26 Jan 2021 09:35) (105/62 - 111/65)  BP(mean): --  ABP: --  ABP(mean): --  RR: 20 (26 Jan 2021 09:35) (18 - 20)  SpO2: 95% (26 Jan 2021 09:35) (95% - 96%)        CAPILLARY BLOOD GLUCOSE          PHYSICAL EXAM:  General: NAD, appears comfortable on 2L  Skin: Warm and dry, no rashes  Neuro: AAOx3, nonfocal  HEENT: PERRL, EOMI, no oral lesions  Neck: Full range of motion, no thyromehaly or JVD  Lungs: Clear to ascultation bilatereally, no wheezes, rales, or rhonchi   Heart: Regular rate and rhythm, no murmurs  Abdomen: Normoactive bowl sounds, soft, nontender, nondistended  Extremities: No lower extremity tenderness, erythema, or edema   Psych: normal mood and affect      HOSPITAL MEDICATIONS:  Standing Meds:  ALBUTerol    90 MICROgram(s) HFA Inhaler 2 Puff(s) Inhalation every 6 hours  budesonide 160 MICROgram(s)/formoterol 4.5 MICROgram(s) Inhaler 2 Puff(s) Inhalation two times a day  dexAMETHasone  Injectable 6 milliGRAM(s) IV Push daily  enoxaparin Injectable 40 milliGRAM(s) SubCutaneous every 12 hours      PRN Meds:  acetaminophen   Tablet .. 650 milliGRAM(s) Oral every 6 hours PRN  artificial tears (preservative free) Ophthalmic Solution 1 Drop(s) Both EYES four times a day PRN  benzonatate 100 milliGRAM(s) Oral three times a day PRN  guaiFENesin   Syrup  (Sugar-Free) 100 milliGRAM(s) Oral every 6 hours PRN  promethazine 25 milliGRAM(s) Oral every 8 hours PRN      LABS:                        12.2   7.11  )-----------( 283      ( 26 Jan 2021 07:29 )             40.9     Hgb Trend: 12.2<--, 12.2<--, 11.8<--, 11.3<--, 11.6<--  01-26    141  |  104  |  15  ----------------------------<  126<H>  4.3   |  24  |  0.54    Ca    9.4      26 Jan 2021 07:29  Phos  3.9     01-25  Mg     2.0     01-25    TPro  7.2  /  Alb  3.3  /  TBili  0.2  /  DBili  x   /  AST  16  /  ALT  44<H>  /  AlkPhos  48  01-26    Creatinine Trend: 0.54<--, 0.52<--, 0.60<--, 0.57<--, 0.59<--, 0.78<--      MICROBIOLOGY:       RADIOLOGY:  < from: Xray Chest 1 View- PORTABLE-Urgent (Xray Chest 1 View- PORTABLE-Urgent .) (01.19.21 @ 17:09) >  EXAM:  XR CHEST PORTABLE URGENT 1V        PROCEDURE DATE:  Jan 19 2021         INTERPRETATION:  CLINICAL INFORMATION: Shortness of breath    EXAM: Frontal radiograph of the chest.    COMPARISON: None available.    FINDINGS:  Bilateral peripheral opacities characteristic of Covid 19 infection.  There is no pleural effusion or pneumothorax.  The heart size is not well evaluated on this projection.  The visualized osseous structures demonstrate no acute pathology.    IMPRESSION: Bilateral peripheral opacities consistent with covid 19 multifocal pneumonia.        DAIANA WARD MD; Resident Radiology  This document has been electronically signed.  OSVALDO ORO MD; Attending Radiologist  This document has been electronically signed. Jan 19 2021  7:03PM    < end of copied text >      PULMONARY FUNCTION TESTS:    EKG:

## 2021-01-26 NOTE — PROGRESS NOTE ADULT - PROBLEM SELECTOR PLAN 2
-patient's CXR had shown bilateral opacities  -continue course of Remdesivir (last dose on 1/25) and complete course of Dexamethasone  -is on 4L of oxygen via nasal canula  -given complaint of bilateral leg pain would check US to exclude DVT  -AST/ALT are within normal limits.

## 2021-01-27 LAB
ALBUMIN SERPL ELPH-MCNC: 3.9 G/DL — SIGNIFICANT CHANGE UP (ref 3.3–5)
ALP SERPL-CCNC: 53 U/L — SIGNIFICANT CHANGE UP (ref 40–120)
ALT FLD-CCNC: 44 U/L — HIGH (ref 4–33)
ANION GAP SERPL CALC-SCNC: 15 MMOL/L — HIGH (ref 7–14)
AST SERPL-CCNC: 13 U/L — SIGNIFICANT CHANGE UP (ref 4–32)
BILIRUB DIRECT SERPL-MCNC: <0.2 MG/DL — SIGNIFICANT CHANGE UP (ref 0–0.2)
BILIRUB INDIRECT FLD-MCNC: >0 MG/DL — SIGNIFICANT CHANGE UP (ref 0–1)
BILIRUB SERPL-MCNC: 0.2 MG/DL — SIGNIFICANT CHANGE UP (ref 0.2–1.2)
BUN SERPL-MCNC: 12 MG/DL — SIGNIFICANT CHANGE UP (ref 7–23)
CALCIUM SERPL-MCNC: 9.3 MG/DL — SIGNIFICANT CHANGE UP (ref 8.4–10.5)
CHLORIDE SERPL-SCNC: 100 MMOL/L — SIGNIFICANT CHANGE UP (ref 98–107)
CO2 SERPL-SCNC: 22 MMOL/L — SIGNIFICANT CHANGE UP (ref 22–31)
CREAT SERPL-MCNC: 0.49 MG/DL — LOW (ref 0.5–1.3)
CREAT SERPL-MCNC: 0.49 MG/DL — LOW (ref 0.5–1.3)
GLUCOSE SERPL-MCNC: 139 MG/DL — HIGH (ref 70–99)
POTASSIUM SERPL-MCNC: 4.2 MMOL/L — SIGNIFICANT CHANGE UP (ref 3.5–5.3)
POTASSIUM SERPL-SCNC: 4.2 MMOL/L — SIGNIFICANT CHANGE UP (ref 3.5–5.3)
PROT SERPL-MCNC: 7.4 G/DL — SIGNIFICANT CHANGE UP (ref 6–8.3)
SODIUM SERPL-SCNC: 137 MMOL/L — SIGNIFICANT CHANGE UP (ref 135–145)

## 2021-01-27 PROCEDURE — 99232 SBSQ HOSP IP/OBS MODERATE 35: CPT

## 2021-01-27 RX ADMIN — Medication 25 MILLIGRAM(S): at 12:29

## 2021-01-27 RX ADMIN — BUDESONIDE AND FORMOTEROL FUMARATE DIHYDRATE 2 PUFF(S): 160; 4.5 AEROSOL RESPIRATORY (INHALATION) at 00:16

## 2021-01-27 RX ADMIN — Medication 1 DROP(S): at 22:13

## 2021-01-27 RX ADMIN — BUDESONIDE AND FORMOTEROL FUMARATE DIHYDRATE 2 PUFF(S): 160; 4.5 AEROSOL RESPIRATORY (INHALATION) at 22:05

## 2021-01-27 RX ADMIN — ALBUTEROL 2 PUFF(S): 90 AEROSOL, METERED ORAL at 06:00

## 2021-01-27 RX ADMIN — ENOXAPARIN SODIUM 40 MILLIGRAM(S): 100 INJECTION SUBCUTANEOUS at 22:06

## 2021-01-27 RX ADMIN — Medication 6 MILLIGRAM(S): at 06:29

## 2021-01-27 RX ADMIN — ENOXAPARIN SODIUM 40 MILLIGRAM(S): 100 INJECTION SUBCUTANEOUS at 08:20

## 2021-01-27 RX ADMIN — BUDESONIDE AND FORMOTEROL FUMARATE DIHYDRATE 2 PUFF(S): 160; 4.5 AEROSOL RESPIRATORY (INHALATION) at 08:19

## 2021-01-27 RX ADMIN — Medication 1 DROP(S): at 06:30

## 2021-01-27 RX ADMIN — ALBUTEROL 2 PUFF(S): 90 AEROSOL, METERED ORAL at 08:19

## 2021-01-27 RX ADMIN — ALBUTEROL 2 PUFF(S): 90 AEROSOL, METERED ORAL at 17:28

## 2021-01-27 RX ADMIN — Medication 100 MILLIGRAM(S): at 06:35

## 2021-01-27 RX ADMIN — Medication 100 MILLIGRAM(S): at 06:31

## 2021-01-27 RX ADMIN — ALBUTEROL 2 PUFF(S): 90 AEROSOL, METERED ORAL at 22:05

## 2021-01-27 RX ADMIN — Medication 100 MILLIGRAM(S): at 22:11

## 2021-01-27 RX ADMIN — Medication 100 MILLIGRAM(S): at 22:12

## 2021-01-27 NOTE — DIETITIAN INITIAL EVALUATION ADULT. - OTHER INFO
Unable to conduct a face to face interview or nutrition-focused physical exam due to limited contact restrictions related to Pt's medical condition and isolation precautions. Collateral information was obtained from chart review. Unable to assess PO intake. No GI distress (nausea/vomiting/diarrhea/constipation) per chart. No chewing or swallowing difficulties at this time per chart. Unable to assess wt history.   Pt would benefit from nutrition supplement of Ensure Enlive.

## 2021-01-27 NOTE — PROGRESS NOTE ADULT - PROBLEM SELECTOR PLAN 2
-patient completed course of Remdesivir  on 1/25  -on IV dexamethasone. To complete on 1/31  -continue oxygen at 1.5L via NC. Saturating at 99% today on that.

## 2021-01-27 NOTE — PROGRESS NOTE ADULT - SUBJECTIVE AND OBJECTIVE BOX
Medicine Progress Note    SUBJECTIVE / OVERNIGHT EVENTS:    Patient still feels like her shortness of breath hasn't improved to the point where she's able to go home. She states to me that she would like to be off the oxygen before she's discharged. Denies chest pain to me. Still with cough and dyspnea. Was seen by pulmonary yesterday and their recommendations were reviewed.      ADDITIONAL REVIEW OF SYSTEMS:  CONSTITUTIONAL: No fevers or chills  EYES/ENT: No visual changes;  No vertigo or throat pain   NECK: No pain or stiffness  RESPIRATORY: (+) cough, wheezes, and dyspnea  CARDIOVASCULAR: No chest pain or palpitations  GASTROINTESTINAL: No abdominal or epigastric pain. No nausea, vomiting, or hematemesis; No diarrhea or constipation. No melena or hematochezia.  GENITOURINARY: No dysuria, frequency or hematuria  NEUROLOGICAL: No numbness or weakness  SKIN: No itching, rashe    MEDICATIONS  (STANDING):  ALBUTerol    90 MICROgram(s) HFA Inhaler 2 Puff(s) Inhalation every 6 hours  budesonide 160 MICROgram(s)/formoterol 4.5 MICROgram(s) Inhaler 2 Puff(s) Inhalation two times a day  dexAMETHasone  Injectable 6 milliGRAM(s) IV Push daily  enoxaparin Injectable 40 milliGRAM(s) SubCutaneous every 12 hours    MEDICATIONS  (PRN):  acetaminophen   Tablet .. 650 milliGRAM(s) Oral every 6 hours PRN Temp greater or equal to 38C (100.4F)  artificial tears (preservative free) Ophthalmic Solution 1 Drop(s) Both EYES four times a day PRN Dry Eyes  benzonatate 100 milliGRAM(s) Oral three times a day PRN Cough  guaiFENesin   Syrup  (Sugar-Free) 100 milliGRAM(s) Oral every 6 hours PRN Cough  promethazine 25 milliGRAM(s) Oral every 8 hours PRN nausea, cough    CAPILLARY BLOOD GLUCOSE        I&O's Summary      PHYSICAL EXAM:  Vital Signs Last 24 Hrs  T(C): 36.5 (27 Jan 2021 11:00), Max: 36.6 (26 Jan 2021 22:24)  T(F): 97.7 (27 Jan 2021 11:00), Max: 97.8 (26 Jan 2021 22:24)  HR: 74 (27 Jan 2021 11:00) (71 - 74)  BP: 115/78 (27 Jan 2021 11:00) (112/81 - 115/78)  BP(mean): --  RR: 16 (27 Jan 2021 11:00) (16 - 18)  SpO2: 99% (27 Jan 2021 11:00) (95% - 100%)  CONSTITUTIONAL: NAD  ENMT: Moist oral mucosa, no pharyngeal injection or exudates; normal dentition  RESPIRATORY: No accessory muscle use; Has wheezes still in both lung fields  CARDIOVASCULAR: Regular rate and rhythm, normal S1 and S2, no murmur/rub/gallop; No lower extremity edema; Peripheral pulses are 2+ bilaterally  ABDOMEN: Nontender to palpation, normoactive bowel sounds, no rebound/guarding; No hepatosplenomegaly  PSYCH: A+O to person, place, and time; affect appropriate  NEUROLOGY: CN 2-12 are intact and symmetric; no gross sensory deficits   SKIN: No rashes; no palpable lesions    LABS:                        12.2   7.11  )-----------( 283      ( 26 Jan 2021 07:29 )             40.9     01-27    137  |  100  |  12  ----------------------------<  139<H>  4.2   |  22  |  0.49<L>    Ca    9.3      27 Jan 2021 06:38    TPro  7.4  /  Alb  3.9  /  TBili  0.2  /  DBili  <0.2  /  AST  13  /  ALT  44<H>  /  AlkPhos  53  01-27              SARS-CoV-2: Detected (19 Jan 2021 19:34)      RADIOLOGY & ADDITIONAL TESTS:  Imaging from Last 24 Hours:    Electrocardiogram/QTc Interval:    COORDINATION OF CARE:  Care Discussed with Consultants/Other Providers:

## 2021-01-27 NOTE — PROGRESS NOTE ADULT - PROBLEM SELECTOR PLAN 1
-seen by pulmonary yesterday. Albuterol changed to standing for now and Symbicort dose increased to two puffs BID.  -continue Dexamethasone given concomitant COVID, but given slow progression, this was changed from oral to IV on 1/26.  -Promethazine added as per patient request on 1/26.

## 2021-01-28 LAB
ALBUMIN SERPL ELPH-MCNC: 3.8 G/DL — SIGNIFICANT CHANGE UP (ref 3.3–5)
ALP SERPL-CCNC: 60 U/L — SIGNIFICANT CHANGE UP (ref 40–120)
ALT FLD-CCNC: 43 U/L — HIGH (ref 4–33)
ANION GAP SERPL CALC-SCNC: 14 MMOL/L — SIGNIFICANT CHANGE UP (ref 7–14)
AST SERPL-CCNC: 16 U/L — SIGNIFICANT CHANGE UP (ref 4–32)
BASOPHILS # BLD AUTO: 0 K/UL — SIGNIFICANT CHANGE UP (ref 0–0.2)
BASOPHILS NFR BLD AUTO: 0 % — SIGNIFICANT CHANGE UP (ref 0–2)
BILIRUB DIRECT SERPL-MCNC: <0.2 MG/DL — SIGNIFICANT CHANGE UP (ref 0–0.2)
BILIRUB INDIRECT FLD-MCNC: >0.1 MG/DL — SIGNIFICANT CHANGE UP (ref 0–1)
BILIRUB SERPL-MCNC: 0.3 MG/DL — SIGNIFICANT CHANGE UP (ref 0.2–1.2)
BUN SERPL-MCNC: 14 MG/DL — SIGNIFICANT CHANGE UP (ref 7–23)
CALCIUM SERPL-MCNC: 10.1 MG/DL — SIGNIFICANT CHANGE UP (ref 8.4–10.5)
CHLORIDE SERPL-SCNC: 100 MMOL/L — SIGNIFICANT CHANGE UP (ref 98–107)
CO2 SERPL-SCNC: 27 MMOL/L — SIGNIFICANT CHANGE UP (ref 22–31)
CREAT SERPL-MCNC: 0.58 MG/DL — SIGNIFICANT CHANGE UP (ref 0.5–1.3)
CREAT SERPL-MCNC: 0.59 MG/DL — SIGNIFICANT CHANGE UP (ref 0.5–1.3)
EOSINOPHIL # BLD AUTO: 0.03 K/UL — SIGNIFICANT CHANGE UP (ref 0–0.5)
EOSINOPHIL NFR BLD AUTO: 0.3 % — SIGNIFICANT CHANGE UP (ref 0–6)
GLUCOSE SERPL-MCNC: 173 MG/DL — HIGH (ref 70–99)
HCT VFR BLD CALC: 45.6 % — HIGH (ref 34.5–45)
HGB BLD-MCNC: 13.7 G/DL — SIGNIFICANT CHANGE UP (ref 11.5–15.5)
IANC: 6.24 K/UL — SIGNIFICANT CHANGE UP (ref 1.5–8.5)
IMM GRANULOCYTES NFR BLD AUTO: 1.5 % — SIGNIFICANT CHANGE UP (ref 0–1.5)
LYMPHOCYTES # BLD AUTO: 1.72 K/UL — SIGNIFICANT CHANGE UP (ref 1–3.3)
LYMPHOCYTES # BLD AUTO: 19 % — SIGNIFICANT CHANGE UP (ref 13–44)
MAGNESIUM SERPL-MCNC: 2.2 MG/DL — SIGNIFICANT CHANGE UP (ref 1.6–2.6)
MCHC RBC-ENTMCNC: 24.2 PG — LOW (ref 27–34)
MCHC RBC-ENTMCNC: 30 GM/DL — LOW (ref 32–36)
MCV RBC AUTO: 80.7 FL — SIGNIFICANT CHANGE UP (ref 80–100)
MONOCYTES # BLD AUTO: 0.92 K/UL — HIGH (ref 0–0.9)
MONOCYTES NFR BLD AUTO: 10.2 % — SIGNIFICANT CHANGE UP (ref 2–14)
NEUTROPHILS # BLD AUTO: 6.24 K/UL — SIGNIFICANT CHANGE UP (ref 1.8–7.4)
NEUTROPHILS NFR BLD AUTO: 69 % — SIGNIFICANT CHANGE UP (ref 43–77)
NRBC # BLD: 0 /100 WBCS — SIGNIFICANT CHANGE UP
NRBC # FLD: 0 K/UL — SIGNIFICANT CHANGE UP
PHOSPHATE SERPL-MCNC: 4.2 MG/DL — SIGNIFICANT CHANGE UP (ref 2.5–4.5)
PLATELET # BLD AUTO: 353 K/UL — SIGNIFICANT CHANGE UP (ref 150–400)
POTASSIUM SERPL-MCNC: 4.2 MMOL/L — SIGNIFICANT CHANGE UP (ref 3.5–5.3)
POTASSIUM SERPL-SCNC: 4.2 MMOL/L — SIGNIFICANT CHANGE UP (ref 3.5–5.3)
PROT SERPL-MCNC: 8 G/DL — SIGNIFICANT CHANGE UP (ref 6–8.3)
RBC # BLD: 5.65 M/UL — HIGH (ref 3.8–5.2)
RBC # FLD: 13.6 % — SIGNIFICANT CHANGE UP (ref 10.3–14.5)
SODIUM SERPL-SCNC: 141 MMOL/L — SIGNIFICANT CHANGE UP (ref 135–145)
WBC # BLD: 9.05 K/UL — SIGNIFICANT CHANGE UP (ref 3.8–10.5)
WBC # FLD AUTO: 9.05 K/UL — SIGNIFICANT CHANGE UP (ref 3.8–10.5)

## 2021-01-28 PROCEDURE — 99232 SBSQ HOSP IP/OBS MODERATE 35: CPT

## 2021-01-28 RX ADMIN — ALBUTEROL 2 PUFF(S): 90 AEROSOL, METERED ORAL at 21:47

## 2021-01-28 RX ADMIN — ALBUTEROL 2 PUFF(S): 90 AEROSOL, METERED ORAL at 05:12

## 2021-01-28 RX ADMIN — ALBUTEROL 2 PUFF(S): 90 AEROSOL, METERED ORAL at 17:28

## 2021-01-28 RX ADMIN — Medication 100 MILLIGRAM(S): at 05:13

## 2021-01-28 RX ADMIN — Medication 1 DROP(S): at 05:17

## 2021-01-28 RX ADMIN — ALBUTEROL 2 PUFF(S): 90 AEROSOL, METERED ORAL at 09:11

## 2021-01-28 RX ADMIN — Medication 100 MILLIGRAM(S): at 21:52

## 2021-01-28 RX ADMIN — Medication 100 MILLIGRAM(S): at 05:14

## 2021-01-28 RX ADMIN — ENOXAPARIN SODIUM 40 MILLIGRAM(S): 100 INJECTION SUBCUTANEOUS at 09:10

## 2021-01-28 RX ADMIN — BUDESONIDE AND FORMOTEROL FUMARATE DIHYDRATE 2 PUFF(S): 160; 4.5 AEROSOL RESPIRATORY (INHALATION) at 21:48

## 2021-01-28 RX ADMIN — BUDESONIDE AND FORMOTEROL FUMARATE DIHYDRATE 2 PUFF(S): 160; 4.5 AEROSOL RESPIRATORY (INHALATION) at 09:10

## 2021-01-28 RX ADMIN — Medication 6 MILLIGRAM(S): at 05:16

## 2021-01-28 RX ADMIN — ENOXAPARIN SODIUM 40 MILLIGRAM(S): 100 INJECTION SUBCUTANEOUS at 21:47

## 2021-01-28 NOTE — PROGRESS NOTE ADULT - PROBLEM SELECTOR PLAN 2
-completed course of remdesivir and to finish dexamethasone on 1/31  -wean oxygen as tolerated  -based on age and -d-dimer level and IMPROVE score shouldn't need extended VTEP upon discharge.

## 2021-01-28 NOTE — PROGRESS NOTE ADULT - PROBLEM SELECTOR PLAN 3
- COVID-19 viral syndrome DETECTED 1/19/21   - SPO2 97% on RA, monitor   - Chest Xray: IMPRESSION: Bilateral peripheral opacities consistent with covid 19 multifocal pneumonia.  - Trend Ferritin , LDH , CRP,  - cont nasal canula oxygen, may use NRB as needed  - PRN Tylenol for fever > 100.4   - Contact, airborne, droplet isolation precautions  - Monitor respiratory status closely  - Patient is at a moderate risk of decompensation at this time.  - started on remdesivir and decadron  - cont to monitor respiratory status
- COVID-19 viral syndrome DETECTED 1/19/21   - SPO2 97% on RA, monitor   - Chest Xray: IMPRESSION: Bilateral peripheral opacities consistent with covid 19 multifocal pneumonia.  - Trend Ferritin , LDH , CRP,  - cont nasal canula oxygen, may use NRB as needed  - PRN Tylenol for fever > 100.4   - Contact, airborne, droplet isolation precautions  - Monitor respiratory status closely  - Patient is at a moderate risk of decompensation at this time.  - Cont remdesivir and decadron  - cont to monitor respiratory status
-continue SC Lovenox
- COVID-19 viral syndrome DETECTED 1/19/21   - SPO2 97% on RA, monitor   - Chest Xray: IMPRESSION: Bilateral peripheral opacities consistent with covid 19 multifocal pneumonia.  - Trend Ferritin , LDH , CRP,  - PRN O2 NC, may use NRB  - PRN Tylenol for fever > 100.4   - Contact, airborne, droplet isolation precautions  - Monitor respiratory status closely  - Patient is at a moderate risk of decompensation at this time.  - Pt is currently on RA 97% , Will Hold decadron and Remdesivir 2/2 to protocol   - Pt was explained if she starts requiring supplemental oxygenation, we will start Remdesivir  and Decadron, Pt says that she does not want to take Remdesivir at this time. Please discuss with patient  prior to starting Remdesivir if needed.  -Cont prednisone for asthma exacerbation
-continue SC Lovenox
-has been on Lovenox while hospitalized.
-on SC Lovenox.
- COVID-19 viral syndrome DETECTED 1/19/21   - SPO2 97% on RA, monitor   - Chest Xray: IMPRESSION: Bilateral peripheral opacities consistent with covid 19 multifocal pneumonia.  - Trend Ferritin , LDH , CRP,  - cont nasal canula oxygen, may use NRB as needed  - PRN Tylenol for fever > 100.4   - Contact, airborne, droplet isolation precautions  - Monitor respiratory status closely  - Patient is at a moderate risk of decompensation at this time.  - Cont remdesivir and decadron  - cont to monitor respiratory status
- COVID-19 viral syndrome DETECTED 1/19/21   - SPO2 97% on RA, monitor   - Chest Xray: IMPRESSION: Bilateral peripheral opacities consistent with covid 19 multifocal pneumonia.  - Trend Ferritin , LDH , CRP,  - cont nasal canula oxygen, may use NRB as needed  - PRN Tylenol for fever > 100.4   - Contact, airborne, droplet isolation precautions  - Monitor respiratory status closely  - Patient is at a moderate risk of decompensation at this time.  - Cont remdesivir and decadron  - cont to monitor respiratory status

## 2021-01-28 NOTE — PROGRESS NOTE ADULT - SUBJECTIVE AND OBJECTIVE BOX
Medicine Progress Note    SUBJECTIVE / OVERNIGHT EVENTS:    Patient states that she's feeling much better today. She still reports a mild cough but states wheezing has dramatically improved. Last night was saturating at 99% on 1.5L.      ADDITIONAL REVIEW OF SYSTEMS:    CONSTITUTIONAL: No fevers or chills  EYES/ENT: No visual changes;  No vertigo or throat pain   NECK: No pain or stiffness  RESPIRATORY: (+) improving cough. Wheezes and dyspnea improved.  CARDIOVASCULAR: No chest pain or palpitations  GASTROINTESTINAL: No abdominal or epigastric pain. No nausea, vomiting, or hematemesis; No diarrhea or constipation. No melena or hematochezia.  GENITOURINARY: No dysuria, frequency or hematuria  NEUROLOGICAL: No numbness or weakness  SKIN: No itching, rashe      MEDICATIONS  (STANDING):  ALBUTerol    90 MICROgram(s) HFA Inhaler 2 Puff(s) Inhalation every 6 hours  budesonide 160 MICROgram(s)/formoterol 4.5 MICROgram(s) Inhaler 2 Puff(s) Inhalation two times a day  dexAMETHasone  Injectable 6 milliGRAM(s) IV Push daily  enoxaparin Injectable 40 milliGRAM(s) SubCutaneous every 12 hours    MEDICATIONS  (PRN):  acetaminophen   Tablet .. 650 milliGRAM(s) Oral every 6 hours PRN Temp greater or equal to 38C (100.4F)  artificial tears (preservative free) Ophthalmic Solution 1 Drop(s) Both EYES four times a day PRN Dry Eyes  benzonatate 100 milliGRAM(s) Oral three times a day PRN Cough  guaiFENesin   Syrup  (Sugar-Free) 100 milliGRAM(s) Oral every 6 hours PRN Cough  promethazine 25 milliGRAM(s) Oral every 8 hours PRN nausea, cough    CAPILLARY BLOOD GLUCOSE        I&O's Summary      PHYSICAL EXAM:  Vital Signs Last 24 Hrs  T(C): --  T(F): --  HR: 74 (28 Jan 2021 05:09) (74 - 74)  BP: 98/56 (28 Jan 2021 05:09) (98/56 - 98/56)  BP(mean): --  RR: 18 (28 Jan 2021 05:09) (18 - 18)  SpO2: 97% (28 Jan 2021 05:09) (97% - 97%)  CONSTITUTIONAL: NAD, well-developed, well-groomed  ENMT: Moist oral mucosa, no pharyngeal injection or exudates; normal dentition  RESPIRATORY: Normal respiratory effort; wheezes have significantly improved  CARDIOVASCULAR: Regular rate and rhythm, normal S1 and S2, no murmur/rub/gallop; No lower extremity edema; Peripheral pulses are 2+ bilaterally  ABDOMEN: Nontender to palpation, normoactive bowel sounds, no rebound/guarding; No hepatosplenomegaly  PSYCH: A+O to person, place, and time; affect appropriate  NEUROLOGY: CN 2-12 are intact and symmetric; no gross sensory deficits   SKIN: No rashes; no palpable lesions    LABS:                        13.7   9.05  )-----------( 353      ( 28 Jan 2021 08:40 )             45.6     01-28    141  |  100  |  14  ----------------------------<  173<H>  4.2   |  27  |  0.59    Ca    10.1      28 Jan 2021 08:40  Phos  4.2     01-28  Mg     2.2     01-28    TPro  8.0  /  Alb  3.8  /  TBili  0.3  /  DBili  <0.2  /  AST  16  /  ALT  43<H>  /  AlkPhos  60  01-28              SARS-CoV-2: Detected (19 Jan 2021 19:34)      RADIOLOGY & ADDITIONAL TESTS:  Imaging from Last 24 Hours:    Electrocardiogram/QTc Interval:    COORDINATION OF CARE:  Care Discussed with Consultants/Other Providers:

## 2021-01-28 NOTE — PROGRESS NOTE ADULT - PROBLEM SELECTOR PLAN 1
-patient has exhibited improvement. Look to wean off oxygen today and check SaO2 on room air at rest and at ambulation. Plan for discharge tomorrow if remains stable.  -continue Dexamethasone given concomitant COVID, set to complete course on 1/31.  -Promethazine added as per patient request on 1/26. Would discharge on this as well when ready.  -will also need Albuterol MDI and Symbicort 160/4.5 two puffs BID on discharge

## 2021-01-28 NOTE — PROGRESS NOTE ADULT - PROBLEM SELECTOR PROBLEM 3
DVT prophylaxis
Pneumonia due to COVID-19 virus
DVT prophylaxis
DVT prophylaxis
Pneumonia due to COVID-19 virus
DVT prophylaxis
Pneumonia due to COVID-19 virus

## 2021-01-29 LAB
ALBUMIN SERPL ELPH-MCNC: 4 G/DL — SIGNIFICANT CHANGE UP (ref 3.3–5)
ALP SERPL-CCNC: 65 U/L — SIGNIFICANT CHANGE UP (ref 40–120)
ALT FLD-CCNC: 41 U/L — HIGH (ref 4–33)
ANION GAP SERPL CALC-SCNC: 13 MMOL/L — SIGNIFICANT CHANGE UP (ref 7–14)
AST SERPL-CCNC: 10 U/L — SIGNIFICANT CHANGE UP (ref 4–32)
BILIRUB SERPL-MCNC: 0.4 MG/DL — SIGNIFICANT CHANGE UP (ref 0.2–1.2)
BUN SERPL-MCNC: 14 MG/DL — SIGNIFICANT CHANGE UP (ref 7–23)
CALCIUM SERPL-MCNC: 10.1 MG/DL — SIGNIFICANT CHANGE UP (ref 8.4–10.5)
CHLORIDE SERPL-SCNC: 99 MMOL/L — SIGNIFICANT CHANGE UP (ref 98–107)
CO2 SERPL-SCNC: 24 MMOL/L — SIGNIFICANT CHANGE UP (ref 22–31)
CREAT SERPL-MCNC: 0.54 MG/DL — SIGNIFICANT CHANGE UP (ref 0.5–1.3)
CRP SERPL-MCNC: <4 MG/L — SIGNIFICANT CHANGE UP
D DIMER BLD IA.RAPID-MCNC: 193 NG/ML DDU — HIGH
FERRITIN SERPL-MCNC: 214 NG/ML — HIGH (ref 15–150)
GLUCOSE SERPL-MCNC: 159 MG/DL — HIGH (ref 70–99)
HCT VFR BLD CALC: 46.2 % — HIGH (ref 34.5–45)
HGB BLD-MCNC: 14.1 G/DL — SIGNIFICANT CHANGE UP (ref 11.5–15.5)
LDH SERPL L TO P-CCNC: 241 U/L — HIGH (ref 135–225)
MAGNESIUM SERPL-MCNC: 2.2 MG/DL — SIGNIFICANT CHANGE UP (ref 1.6–2.6)
MCHC RBC-ENTMCNC: 24.4 PG — LOW (ref 27–34)
MCHC RBC-ENTMCNC: 30.5 GM/DL — LOW (ref 32–36)
MCV RBC AUTO: 80.1 FL — SIGNIFICANT CHANGE UP (ref 80–100)
NRBC # BLD: 0 /100 WBCS — SIGNIFICANT CHANGE UP
NRBC # FLD: 0 K/UL — SIGNIFICANT CHANGE UP
PHOSPHATE SERPL-MCNC: 3.2 MG/DL — SIGNIFICANT CHANGE UP (ref 2.5–4.5)
PLATELET # BLD AUTO: 354 K/UL — SIGNIFICANT CHANGE UP (ref 150–400)
POTASSIUM SERPL-MCNC: 4 MMOL/L — SIGNIFICANT CHANGE UP (ref 3.5–5.3)
POTASSIUM SERPL-SCNC: 4 MMOL/L — SIGNIFICANT CHANGE UP (ref 3.5–5.3)
PROCALCITONIN SERPL-MCNC: 0.06 NG/ML — SIGNIFICANT CHANGE UP (ref 0.02–0.1)
PROT SERPL-MCNC: 8.2 G/DL — SIGNIFICANT CHANGE UP (ref 6–8.3)
RBC # BLD: 5.77 M/UL — HIGH (ref 3.8–5.2)
RBC # FLD: 13.7 % — SIGNIFICANT CHANGE UP (ref 10.3–14.5)
SODIUM SERPL-SCNC: 136 MMOL/L — SIGNIFICANT CHANGE UP (ref 135–145)
WBC # BLD: 9.58 K/UL — SIGNIFICANT CHANGE UP (ref 3.8–10.5)
WBC # FLD AUTO: 9.58 K/UL — SIGNIFICANT CHANGE UP (ref 3.8–10.5)

## 2021-01-29 PROCEDURE — 99232 SBSQ HOSP IP/OBS MODERATE 35: CPT

## 2021-01-29 RX ORDER — ACETAMINOPHEN 500 MG
650 TABLET ORAL EVERY 6 HOURS
Refills: 0 | Status: DISCONTINUED | OUTPATIENT
Start: 2021-01-29 | End: 2021-01-31

## 2021-01-29 RX ADMIN — ALBUTEROL 2 PUFF(S): 90 AEROSOL, METERED ORAL at 16:46

## 2021-01-29 RX ADMIN — ENOXAPARIN SODIUM 40 MILLIGRAM(S): 100 INJECTION SUBCUTANEOUS at 22:44

## 2021-01-29 RX ADMIN — Medication 100 MILLIGRAM(S): at 15:20

## 2021-01-29 RX ADMIN — Medication 650 MILLIGRAM(S): at 16:07

## 2021-01-29 RX ADMIN — ALBUTEROL 2 PUFF(S): 90 AEROSOL, METERED ORAL at 22:43

## 2021-01-29 RX ADMIN — Medication 100 MILLIGRAM(S): at 22:45

## 2021-01-29 RX ADMIN — ALBUTEROL 2 PUFF(S): 90 AEROSOL, METERED ORAL at 05:22

## 2021-01-29 RX ADMIN — ENOXAPARIN SODIUM 40 MILLIGRAM(S): 100 INJECTION SUBCUTANEOUS at 10:50

## 2021-01-29 RX ADMIN — BUDESONIDE AND FORMOTEROL FUMARATE DIHYDRATE 2 PUFF(S): 160; 4.5 AEROSOL RESPIRATORY (INHALATION) at 22:43

## 2021-01-29 RX ADMIN — BUDESONIDE AND FORMOTEROL FUMARATE DIHYDRATE 2 PUFF(S): 160; 4.5 AEROSOL RESPIRATORY (INHALATION) at 10:50

## 2021-01-29 RX ADMIN — Medication 100 MILLIGRAM(S): at 05:58

## 2021-01-29 RX ADMIN — ALBUTEROL 2 PUFF(S): 90 AEROSOL, METERED ORAL at 10:50

## 2021-01-29 RX ADMIN — Medication 100 MILLIGRAM(S): at 05:57

## 2021-01-29 RX ADMIN — Medication 6 MILLIGRAM(S): at 05:21

## 2021-01-29 NOTE — PROGRESS NOTE ADULT - SUBJECTIVE AND OBJECTIVE BOX
Medicine Progress Note    SUBJECTIVE / OVERNIGHT EVENTS:    Patient states that she still feels much better compared to when first admitted. Discussed discharge with her and she wants to finish her course of Dexamethasone in hospital before being discharged.       ADDITIONAL REVIEW OF SYSTEMS:    CONSTITUTIONAL: No fevers or chills  EYES/ENT: No visual changes;  No vertigo or throat pain   NECK: No pain or stiffness  RESPIRATORY: (+) improving cough. Wheezes and dyspnea improved.  CARDIOVASCULAR: No chest pain or palpitations  GASTROINTESTINAL: No abdominal or epigastric pain. No nausea, vomiting, or hematemesis; No diarrhea or constipation. No melena or hematochezia.  GENITOURINARY: No dysuria, frequency or hematuria  NEUROLOGICAL: No numbness or weakness  SKIN: No itching, rashe    MEDICATIONS  (STANDING):  ALBUTerol    90 MICROgram(s) HFA Inhaler 2 Puff(s) Inhalation every 6 hours  budesonide 160 MICROgram(s)/formoterol 4.5 MICROgram(s) Inhaler 2 Puff(s) Inhalation two times a day  dexAMETHasone  Injectable 6 milliGRAM(s) IV Push daily  enoxaparin Injectable 40 milliGRAM(s) SubCutaneous every 12 hours    MEDICATIONS  (PRN):  acetaminophen   Tablet .. 650 milliGRAM(s) Oral every 6 hours PRN Temp greater or equal to 38C (100.4F)  artificial tears (preservative free) Ophthalmic Solution 1 Drop(s) Both EYES four times a day PRN Dry Eyes  benzonatate 100 milliGRAM(s) Oral three times a day PRN Cough  guaiFENesin   Syrup  (Sugar-Free) 100 milliGRAM(s) Oral every 6 hours PRN Cough  promethazine 25 milliGRAM(s) Oral every 8 hours PRN nausea, cough    CAPILLARY BLOOD GLUCOSE        I&O's Summary      PHYSICAL EXAM:  Vital Signs Last 24 Hrs  T(C): 36.3 (29 Jan 2021 10:49), Max: 36.3 (29 Jan 2021 10:49)  T(F): 97.4 (29 Jan 2021 10:49), Max: 97.4 (29 Jan 2021 10:49)  HR: 115 (29 Jan 2021 10:50) (66 - 115)  BP: 127/77 (29 Jan 2021 10:49) (122/58 - 127/77)  BP(mean): --  RR: 22 (29 Jan 2021 10:50) (18 - 22)  SpO2: 96% (29 Jan 2021 10:50) (96% - 97%)  CONSTITUTIONAL: NAD, well-developed, well-groomed  ENMT: Moist oral mucosa, no pharyngeal injection or exudates; normal dentition  RESPIRATORY: Normal respiratory effort; lungs are clear to auscultation bilaterally  CARDIOVASCULAR: Regular rate and rhythm, normal S1 and S2, no murmur/rub/gallop; No lower extremity edema; Peripheral pulses are 2+ bilaterally  ABDOMEN: Nontender to palpation, normoactive bowel sounds, no rebound/guarding; No hepatosplenomegaly  PSYCH: A+O to person, place, and time; affect appropriate  NEUROLOGY: CN 2-12 are intact and symmetric; no gross sensory deficits   SKIN: No rashes; no palpable lesions    LABS:                        14.1   9.58  )-----------( 354      ( 29 Jan 2021 07:41 )             46.2     01-29    136  |  99  |  14  ----------------------------<  159<H>  4.0   |  24  |  0.54    Ca    10.1      29 Jan 2021 07:41  Phos  3.2     01-29  Mg     2.2     01-29    TPro  8.2  /  Alb  4.0  /  TBili  0.4  /  DBili  x   /  AST  10  /  ALT  41<H>  /  AlkPhos  65  01-29              SARS-CoV-2: Detected (19 Jan 2021 19:34)      RADIOLOGY & ADDITIONAL TESTS:  Imaging from Last 24 Hours:    Electrocardiogram/QTc Interval:    COORDINATION OF CARE:  Care Discussed with Consultants/Other Providers:

## 2021-01-29 NOTE — PROGRESS NOTE ADULT - PROBLEM SELECTOR PLAN 1
-completed course of remdesivir and to finish dexamethasone  -now off oxygen  -based on age and -d-dimer level and IMPROVE score shouldn't need extended VTEP upon discharge.

## 2021-01-29 NOTE — PROGRESS NOTE ADULT - PROBLEM SELECTOR PLAN 2
-patient has exhibited improvement. Check SaO2 on room air at rest and at ambulation. Plan for discharge tomorrow if remains stable.  -continue Dexamethasone given concomitant COVID, set to complete course this weekend.  -Promethazine added as per patient request on 1/26. Would discharge on this as well when ready.  -will also need Albuterol MDI and Symbicort 160/4.5 two puffs BID on discharge.

## 2021-01-30 LAB
ALBUMIN SERPL ELPH-MCNC: 3.7 G/DL — SIGNIFICANT CHANGE UP (ref 3.3–5)
ALP SERPL-CCNC: 55 U/L — SIGNIFICANT CHANGE UP (ref 40–120)
ALT FLD-CCNC: 34 U/L — HIGH (ref 4–33)
ANION GAP SERPL CALC-SCNC: 15 MMOL/L — HIGH (ref 7–14)
AST SERPL-CCNC: 11 U/L — SIGNIFICANT CHANGE UP (ref 4–32)
BILIRUB DIRECT SERPL-MCNC: <0.2 MG/DL — SIGNIFICANT CHANGE UP (ref 0–0.2)
BILIRUB INDIRECT FLD-MCNC: >0.1 MG/DL — SIGNIFICANT CHANGE UP (ref 0–1)
BILIRUB SERPL-MCNC: 0.3 MG/DL — SIGNIFICANT CHANGE UP (ref 0.2–1.2)
BUN SERPL-MCNC: 16 MG/DL — SIGNIFICANT CHANGE UP (ref 7–23)
CALCIUM SERPL-MCNC: 9.8 MG/DL — SIGNIFICANT CHANGE UP (ref 8.4–10.5)
CHLORIDE SERPL-SCNC: 101 MMOL/L — SIGNIFICANT CHANGE UP (ref 98–107)
CO2 SERPL-SCNC: 22 MMOL/L — SIGNIFICANT CHANGE UP (ref 22–31)
CREAT SERPL-MCNC: 0.51 MG/DL — SIGNIFICANT CHANGE UP (ref 0.5–1.3)
CREAT SERPL-MCNC: 0.51 MG/DL — SIGNIFICANT CHANGE UP (ref 0.5–1.3)
GLUCOSE SERPL-MCNC: 173 MG/DL — HIGH (ref 70–99)
HCT VFR BLD CALC: 42.8 % — SIGNIFICANT CHANGE UP (ref 34.5–45)
HGB BLD-MCNC: 13.2 G/DL — SIGNIFICANT CHANGE UP (ref 11.5–15.5)
MAGNESIUM SERPL-MCNC: 2.4 MG/DL — SIGNIFICANT CHANGE UP (ref 1.6–2.6)
MCHC RBC-ENTMCNC: 24.5 PG — LOW (ref 27–34)
MCHC RBC-ENTMCNC: 30.8 GM/DL — LOW (ref 32–36)
MCV RBC AUTO: 79.4 FL — LOW (ref 80–100)
NRBC # BLD: 0 /100 WBCS — SIGNIFICANT CHANGE UP
NRBC # FLD: 0 K/UL — SIGNIFICANT CHANGE UP
PHOSPHATE SERPL-MCNC: 3.7 MG/DL — SIGNIFICANT CHANGE UP (ref 2.5–4.5)
PLATELET # BLD AUTO: 317 K/UL — SIGNIFICANT CHANGE UP (ref 150–400)
POTASSIUM SERPL-MCNC: 4.5 MMOL/L — SIGNIFICANT CHANGE UP (ref 3.5–5.3)
POTASSIUM SERPL-SCNC: 4.5 MMOL/L — SIGNIFICANT CHANGE UP (ref 3.5–5.3)
PROT SERPL-MCNC: 7.5 G/DL — SIGNIFICANT CHANGE UP (ref 6–8.3)
RBC # BLD: 5.39 M/UL — HIGH (ref 3.8–5.2)
RBC # FLD: 13.9 % — SIGNIFICANT CHANGE UP (ref 10.3–14.5)
SODIUM SERPL-SCNC: 138 MMOL/L — SIGNIFICANT CHANGE UP (ref 135–145)
WBC # BLD: 8.92 K/UL — SIGNIFICANT CHANGE UP (ref 3.8–10.5)
WBC # FLD AUTO: 8.92 K/UL — SIGNIFICANT CHANGE UP (ref 3.8–10.5)

## 2021-01-30 PROCEDURE — 99232 SBSQ HOSP IP/OBS MODERATE 35: CPT

## 2021-01-30 RX ADMIN — ENOXAPARIN SODIUM 40 MILLIGRAM(S): 100 INJECTION SUBCUTANEOUS at 10:34

## 2021-01-30 RX ADMIN — ALBUTEROL 2 PUFF(S): 90 AEROSOL, METERED ORAL at 04:04

## 2021-01-30 RX ADMIN — BUDESONIDE AND FORMOTEROL FUMARATE DIHYDRATE 2 PUFF(S): 160; 4.5 AEROSOL RESPIRATORY (INHALATION) at 10:34

## 2021-01-30 RX ADMIN — Medication 100 MILLIGRAM(S): at 15:16

## 2021-01-30 RX ADMIN — Medication 100 MILLIGRAM(S): at 06:34

## 2021-01-30 RX ADMIN — ALBUTEROL 2 PUFF(S): 90 AEROSOL, METERED ORAL at 10:34

## 2021-01-30 RX ADMIN — ALBUTEROL 2 PUFF(S): 90 AEROSOL, METERED ORAL at 22:46

## 2021-01-30 RX ADMIN — Medication 1 DROP(S): at 21:18

## 2021-01-30 RX ADMIN — Medication 6 MILLIGRAM(S): at 06:35

## 2021-01-30 RX ADMIN — Medication 1 DROP(S): at 06:34

## 2021-01-30 RX ADMIN — ENOXAPARIN SODIUM 40 MILLIGRAM(S): 100 INJECTION SUBCUTANEOUS at 21:18

## 2021-01-30 RX ADMIN — ALBUTEROL 2 PUFF(S): 90 AEROSOL, METERED ORAL at 15:16

## 2021-01-30 NOTE — PROGRESS NOTE ADULT - SUBJECTIVE AND OBJECTIVE BOX
Medicine Progress Note      SUBJECTIVE / OVERNIGHT EVENTS:    Patient states that she continues to improve. Slight cough reported. Dyspnea and wheezes have improved per patient.    ADDITIONAL REVIEW OF SYSTEMS:    GI: no abdominal pain or blood per rectum  : no hematuria or dysuria  CV: no chest pain or palpitations  Pulmonary: Wheezes, dyspnea improved  Neuro: no focal motor extremity weakness or numbness/tingling    MEDICATIONS  (STANDING):  ALBUTerol    90 MICROgram(s) HFA Inhaler 2 Puff(s) Inhalation every 6 hours  budesonide 160 MICROgram(s)/formoterol 4.5 MICROgram(s) Inhaler 2 Puff(s) Inhalation two times a day  dexAMETHasone  Injectable 6 milliGRAM(s) IV Push daily  enoxaparin Injectable 40 milliGRAM(s) SubCutaneous every 12 hours    MEDICATIONS  (PRN):  acetaminophen   Tablet .. 650 milliGRAM(s) Oral every 6 hours PRN Temp greater or equal to 38C (100.4F), Mild Pain (1 - 3), Moderate Pain (4 - 6)  artificial tears (preservative free) Ophthalmic Solution 1 Drop(s) Both EYES four times a day PRN Dry Eyes  benzonatate 100 milliGRAM(s) Oral three times a day PRN Cough  guaiFENesin   Syrup  (Sugar-Free) 100 milliGRAM(s) Oral every 6 hours PRN Cough  promethazine 25 milliGRAM(s) Oral every 8 hours PRN nausea, cough    CAPILLARY BLOOD GLUCOSE        I&O's Summary      PHYSICAL EXAM:  Vital Signs Last 24 Hrs  T(C): 36.4 (30 Jan 2021 06:29), Max: 36.4 (30 Jan 2021 06:29)  T(F): 97.5 (30 Jan 2021 06:29), Max: 97.5 (30 Jan 2021 06:29)  HR: 93 (30 Jan 2021 06:29) (77 - 115)  BP: 109/69 (30 Jan 2021 06:29) (109/69 - 127/77)  BP(mean): --  RR: 19 (30 Jan 2021 06:29) (19 - 22)  SpO2: 96% (30 Jan 2021 06:29) (96% - 97%)  CONSTITUTIONAL: NAD, well-developed, well-groomed  ENMT: Moist oral mucosa, no pharyngeal injection or exudates; normal dentition  RESPIRATORY: Normal respiratory effort; lungs are clear to auscultation bilaterally  CARDIOVASCULAR: Regular rate and rhythm, normal S1 and S2, no murmur/rub/gallop; No lower extremity edema; Peripheral pulses are 2+ bilaterally  ABDOMEN: Nontender to palpation, normoactive bowel sounds, no rebound/guarding; No hepatosplenomegaly  PSYCH: A+O to person, place, and time; affect appropriate  NEUROLOGY: CN 2-12 are intact and symmetric; no gross sensory deficits   SKIN: No rashes; no palpable lesions    LABS:                        14.1   9.58  )-----------( 354      ( 29 Jan 2021 07:41 )             46.2     01-30    138  |  101  |  16  ----------------------------<  173<H>  4.5   |  22  |  0.51    Ca    9.8      30 Jan 2021 07:35  Phos  3.7     01-30  Mg     2.4     01-30    TPro  7.5  /  Alb  3.7  /  TBili  0.3  /  DBili  <0.2  /  AST  11  /  ALT  34<H>  /  AlkPhos  55  01-30              SARS-CoV-2: Detected (19 Jan 2021 19:34)      RADIOLOGY & ADDITIONAL TESTS:  Imaging from Last 24 Hours:    Electrocardiogram/QTc Interval:    COORDINATION OF CARE:  Care Discussed with Consultants/Other Providers:

## 2021-01-30 NOTE — PROGRESS NOTE ADULT - PROBLEM SELECTOR PLAN 2
-patient has exhibited improvement. Check SaO2 on room air at rest and at ambulation. Discharge planning for this weekend.  -continue Dexamethasone given concomitant COVID, set to complete course this weekend.  -Promethazine added as per patient request on 1/26. Would discharge on this as well when ready.  -will also need Albuterol MDI and Symbicort 160/4.5 two puffs BID on discharge.

## 2021-01-31 VITALS — OXYGEN SATURATION: 94 % | HEART RATE: 110 BPM

## 2021-01-31 PROCEDURE — 99239 HOSP IP/OBS DSCHRG MGMT >30: CPT

## 2021-01-31 RX ORDER — BUDESONIDE AND FORMOTEROL FUMARATE DIHYDRATE 160; 4.5 UG/1; UG/1
2 AEROSOL RESPIRATORY (INHALATION)
Qty: 0 | Refills: 0 | DISCHARGE

## 2021-01-31 RX ORDER — ALBUTEROL 90 UG/1
2 AEROSOL, METERED ORAL
Qty: 1 | Refills: 0
Start: 2021-01-31 | End: 2021-03-01

## 2021-01-31 RX ORDER — BUDESONIDE AND FORMOTEROL FUMARATE DIHYDRATE 160; 4.5 UG/1; UG/1
2 AEROSOL RESPIRATORY (INHALATION)
Qty: 1 | Refills: 0
Start: 2021-01-31 | End: 2021-03-01

## 2021-01-31 RX ADMIN — BUDESONIDE AND FORMOTEROL FUMARATE DIHYDRATE 2 PUFF(S): 160; 4.5 AEROSOL RESPIRATORY (INHALATION) at 05:46

## 2021-01-31 RX ADMIN — ALBUTEROL 2 PUFF(S): 90 AEROSOL, METERED ORAL at 10:38

## 2021-01-31 RX ADMIN — BUDESONIDE AND FORMOTEROL FUMARATE DIHYDRATE 2 PUFF(S): 160; 4.5 AEROSOL RESPIRATORY (INHALATION) at 10:38

## 2021-01-31 RX ADMIN — ENOXAPARIN SODIUM 40 MILLIGRAM(S): 100 INJECTION SUBCUTANEOUS at 10:38

## 2021-01-31 RX ADMIN — ALBUTEROL 2 PUFF(S): 90 AEROSOL, METERED ORAL at 16:25

## 2021-01-31 RX ADMIN — Medication 6 MILLIGRAM(S): at 05:45

## 2021-01-31 NOTE — PROGRESS NOTE ADULT - PROVIDER SPECIALTY LIST ADULT
Hospitalist
Internal Medicine
Hospitalist

## 2021-01-31 NOTE — PROGRESS NOTE ADULT - ASSESSMENT
30 y/ Female with PMHx of Asthma( on Symbicort) presents to [facility] from  for shortness of breath, cough , wheezing , chest pain ,Fever, chills, and Body aches. Pt was refereed to [facility] from , pt was diagnosed with COVID-19, Pt is being admitted for asthma exacerbation 2/2 COVID19 and Chest Pain.
Patient is a 30 year old female who was admitted to the hospital for asthma exacerbation due to COVID pneumonia
Patient is a 30 year old female with a PMHx of asthma who's here with an acute asthma exacerbation due to COVID-19
30 y/ Female with PMHx of Asthma( on Symbicort) presents to [facility] from  for shortness of breath, cough , wheezing , chest pain ,Fever, chills, and Body aches. Pt was refereed to [facility] from , pt was diagnosed with COVID-19, Pt is being admitted for asthma exacerbation 2/2 COVID19 and Chest Pain.
Patient is a 30 year old female with a PMHx of asthma who's here with an acute asthma exacerbation due to COVID-19  
Patient is a 30 year old female with a PMHx of asthma who's here with an acute asthma exacerbation due to COVID-19  
30 y/ Female with PMHx of Asthma( on Symbicort) presents to [facility] from  for shortness of breath, cough , wheezing , chest pain ,Fever, chills, and Body aches. Pt was refereed to [facility] from , pt was diagnosed with COVID-19, Pt is being admitted for asthma exacerbation 2/2 COVID19 and Chest Pain.
30 y/ Female with PMHx of Asthma( on Symbicort) presents to [facility] from  for shortness of breath, cough , wheezing , chest pain ,Fever, chills, and Body aches. Pt was refereed to [facility] from , pt was diagnosed with COVID-19, Pt is being admitted for asthma exacerbation 2/2 COVID19 and Chest Pain.
30 y/ Female with PMHx of Asthma( on Symbicort) presents to [facility] from  for shortness of breath, cough , wheezing , chest pain ,Fever, chills, and Body aches. Pt was refereed to [facility] from , pt was diagnosed with COVID-19, Pt is being admitted for asthma exacerbation 2/2 COVID19 and Chest Pain.

## 2021-01-31 NOTE — PROGRESS NOTE ADULT - PROBLEM SELECTOR PLAN 1
-patient has improved. Was seen by pulmonary during course of admission. Did require oxygen earlier in admission. Now saturating well and symptoms improved and have remained stable off oxygen. Can discharge today with Symbicort, Albuterol MDI, and phenergan PRN. To follow up with pulmonary upon discharge.

## 2021-01-31 NOTE — PROGRESS NOTE ADULT - SUBJECTIVE AND OBJECTIVE BOX
Medicine Progress Note    Patient is a 30y old  Female who presents with a chief complaint of Asthma exacerbation due to COVID (30 Jan 2021 09:22)      SUBJECTIVE / OVERNIGHT EVENTS:    Patient states that she continues to do well. No wheezing, dyspnea, chest pain. Ready for discharge today.    ADDITIONAL REVIEW OF SYSTEMS:    Reviewed and negative without chest pain, palpitations, wheezing, dyspnea, nausea, vomiting, abdominal pain.     MEDICATIONS  (STANDING):  ALBUTerol    90 MICROgram(s) HFA Inhaler 2 Puff(s) Inhalation every 6 hours  budesonide 160 MICROgram(s)/formoterol 4.5 MICROgram(s) Inhaler 2 Puff(s) Inhalation two times a day  enoxaparin Injectable 40 milliGRAM(s) SubCutaneous every 12 hours    MEDICATIONS  (PRN):  acetaminophen   Tablet .. 650 milliGRAM(s) Oral every 6 hours PRN Temp greater or equal to 38C (100.4F), Mild Pain (1 - 3), Moderate Pain (4 - 6)  artificial tears (preservative free) Ophthalmic Solution 1 Drop(s) Both EYES four times a day PRN Dry Eyes  benzonatate 100 milliGRAM(s) Oral three times a day PRN Cough  guaiFENesin   Syrup  (Sugar-Free) 100 milliGRAM(s) Oral every 6 hours PRN Cough  promethazine 25 milliGRAM(s) Oral every 8 hours PRN nausea, cough    CAPILLARY BLOOD GLUCOSE        I&O's Summary      PHYSICAL EXAM:  Vital Signs Last 24 Hrs  T(C): 37.1 (31 Jan 2021 05:43), Max: 37.1 (31 Jan 2021 05:43)  T(F): 98.7 (31 Jan 2021 05:43), Max: 98.7 (31 Jan 2021 05:43)  HR: 78 (31 Jan 2021 05:43) (78 - 92)  BP: 111/73 (31 Jan 2021 05:43) (110/67 - 111/73)  BP(mean): --  RR: 18 (31 Jan 2021 05:43) (18 - 19)  SpO2: 99% (31 Jan 2021 05:43) (98% - 99%)  CONSTITUTIONAL: NAD, well-developed, well-groomed  ENMT: Moist oral mucosa, no pharyngeal injection or exudates; normal dentition  RESPIRATORY: Normal respiratory effort; lungs are clear to auscultation bilaterally  CARDIOVASCULAR: Regular rate and rhythm, normal S1 and S2, no murmur/rub/gallop; No lower extremity edema; Peripheral pulses are 2+ bilaterally  ABDOMEN: Nontender to palpation, normoactive bowel sounds, no rebound/guarding; No hepatosplenomegaly  PSYCH: A+O to person, place, and time; affect appropriate  NEUROLOGY: CN 2-12 are intact and symmetric; no gross sensory deficits   SKIN: No rashes; no palpable lesions    LABS:                        13.2   8.92  )-----------( 317      ( 30 Jan 2021 09:49 )             42.8     01-30    138  |  101  |  16  ----------------------------<  173<H>  4.5   |  22  |  0.51    Ca    9.8      30 Jan 2021 07:35  Phos  3.7     01-30  Mg     2.4     01-30    TPro  7.5  /  Alb  3.7  /  TBili  0.3  /  DBili  <0.2  /  AST  11  /  ALT  34<H>  /  AlkPhos  55  01-30              SARS-CoV-2: Detected (19 Jan 2021 19:34)      RADIOLOGY & ADDITIONAL TESTS:  Imaging from Last 24 Hours:    Electrocardiogram/QTc Interval:    COORDINATION OF CARE:  Care Discussed with Consultants/Other Providers:

## 2021-01-31 NOTE — PROGRESS NOTE ADULT - PROBLEM SELECTOR PROBLEM 1
Acute asthma exacerbation
Pneumonia due to COVID-19 virus
Moderate persistent asthma with acute exacerbation
Moderate persistent asthma with acute exacerbation
Pneumonia due to COVID-19 virus
Moderate persistent asthma with acute exacerbation
Viral sepsis
Moderate persistent asthma with acute exacerbation
Viral sepsis
Viral sepsis

## 2021-01-31 NOTE — DISCHARGE NOTE NURSING/CASE MANAGEMENT/SOCIAL WORK - NSDCPNINST_GEN_ALL_CORE
Patient A&Ox4, no c/o pain during this shift, patient discharged to home this afternoon, no distress noted.

## 2021-01-31 NOTE — PROGRESS NOTE ADULT - REASON FOR ADMISSION
shortness of breath
Asthma exacerbation due to COVID
COVID
COVID
shortness of breath
Asthma exacerbation due to COVID
asthma exacerbation
shortness of breath
COVID
Acute respiratory failure due to COVID.
shortness of breath
shortness of breath

## 2021-01-31 NOTE — DISCHARGE NOTE NURSING/CASE MANAGEMENT/SOCIAL WORK - PATIENT PORTAL LINK FT
You can access the FollowMyHealth Patient Portal offered by [facility] by registering at the following website: [email or web address]. By joining [facility]’s FollowMyHealth portal, you will also be able to view your health information using other applications (apps) compatible with our system.

## 2021-01-31 NOTE — PROGRESS NOTE ADULT - PROBLEM SELECTOR PROBLEM 2
Pneumonia due to COVID-19 virus
Acute asthma exacerbation
Pneumonia due to COVID-19 virus
Pneumonia due to COVID-19 virus
Moderate persistent asthma with acute exacerbation
Pneumonia due to COVID-19 virus
Acute asthma exacerbation
Moderate persistent asthma with acute exacerbation
Pneumonia due to COVID-19 virus
Acute asthma exacerbation

## 2021-04-20 NOTE — H&P ADULT - NSICDXFAMILYHX_GEN_ALL_CORE_FT
[FreeTextEntry1] : covid pcr sent will sent clearance letter for return to [facility] once results are back.\par visit conducted in full PPE attire.
No pertinent family history in first degree relatives

## 2021-05-04 ENCOUNTER — EMERGENCY (EMERGENCY)
Facility: HOSPITAL | Age: 31
LOS: 0 days | Discharge: HOME | End: 2021-05-04
Attending: EMERGENCY MEDICINE
Payer: COMMERCIAL

## 2021-05-04 VITALS
HEART RATE: 110 BPM | OXYGEN SATURATION: 100 % | TEMPERATURE: 98 F | RESPIRATION RATE: 18 BRPM | WEIGHT: 210.1 LBS | HEIGHT: 66 IN | SYSTOLIC BLOOD PRESSURE: 138 MMHG | DIASTOLIC BLOOD PRESSURE: 94 MMHG

## 2021-05-04 DIAGNOSIS — F41.9 ANXIETY DISORDER, UNSPECIFIED: ICD-10-CM

## 2021-05-04 DIAGNOSIS — F15.29 OTHER STIMULANT DEPENDENCE WITH UNSPECIFIED STIMULANT-INDUCED DISORDER: ICD-10-CM

## 2021-05-04 DIAGNOSIS — R07.9 CHEST PAIN, UNSPECIFIED: ICD-10-CM

## 2021-05-04 DIAGNOSIS — J45.909 UNSPECIFIED ASTHMA, UNCOMPLICATED: ICD-10-CM

## 2021-05-04 LAB
ALBUMIN SERPL ELPH-MCNC: 3.6 G/DL — SIGNIFICANT CHANGE UP (ref 3.3–5)
ALP SERPL-CCNC: 56 U/L — SIGNIFICANT CHANGE UP (ref 40–120)
ALT FLD-CCNC: 39 U/L — SIGNIFICANT CHANGE UP (ref 12–78)
ANION GAP SERPL CALC-SCNC: 8 MMOL/L — SIGNIFICANT CHANGE UP (ref 5–17)
APAP SERPL-MCNC: < 2 UG/ML (ref 10–30)
AST SERPL-CCNC: 20 U/L — SIGNIFICANT CHANGE UP (ref 15–37)
BASOPHILS # BLD AUTO: 0.03 K/UL — SIGNIFICANT CHANGE UP (ref 0–0.2)
BASOPHILS NFR BLD AUTO: 0.4 % — SIGNIFICANT CHANGE UP (ref 0–2)
BILIRUB SERPL-MCNC: 0.4 MG/DL — SIGNIFICANT CHANGE UP (ref 0.2–1.2)
BUN SERPL-MCNC: 11 MG/DL — SIGNIFICANT CHANGE UP (ref 7–23)
CALCIUM SERPL-MCNC: 9.5 MG/DL — SIGNIFICANT CHANGE UP (ref 8.5–10.1)
CHLORIDE SERPL-SCNC: 105 MMOL/L — SIGNIFICANT CHANGE UP (ref 96–108)
CO2 SERPL-SCNC: 26 MMOL/L — SIGNIFICANT CHANGE UP (ref 22–31)
CREAT SERPL-MCNC: 0.73 MG/DL — SIGNIFICANT CHANGE UP (ref 0.5–1.3)
EOSINOPHIL # BLD AUTO: 0.05 K/UL — SIGNIFICANT CHANGE UP (ref 0–0.5)
EOSINOPHIL NFR BLD AUTO: 0.7 % — SIGNIFICANT CHANGE UP (ref 0–6)
ETHANOL SERPL-MCNC: <10 MG/DL — SIGNIFICANT CHANGE UP (ref 0–10)
GLUCOSE SERPL-MCNC: 87 MG/DL — SIGNIFICANT CHANGE UP (ref 70–99)
HCG UR QL: NEGATIVE — SIGNIFICANT CHANGE UP
HCT VFR BLD CALC: 40.9 % — SIGNIFICANT CHANGE UP (ref 34.5–45)
HGB BLD-MCNC: 12.8 G/DL — SIGNIFICANT CHANGE UP (ref 11.5–15.5)
IMM GRANULOCYTES NFR BLD AUTO: 0.5 % — SIGNIFICANT CHANGE UP (ref 0–1.5)
LYMPHOCYTES # BLD AUTO: 1.54 K/UL — SIGNIFICANT CHANGE UP (ref 1–3.3)
LYMPHOCYTES # BLD AUTO: 20.6 % — SIGNIFICANT CHANGE UP (ref 13–44)
MCHC RBC-ENTMCNC: 25.3 PG — LOW (ref 27–34)
MCHC RBC-ENTMCNC: 31.3 GM/DL — LOW (ref 32–36)
MCV RBC AUTO: 81 FL — SIGNIFICANT CHANGE UP (ref 80–100)
MONOCYTES # BLD AUTO: 0.63 K/UL — SIGNIFICANT CHANGE UP (ref 0–0.9)
MONOCYTES NFR BLD AUTO: 8.4 % — SIGNIFICANT CHANGE UP (ref 2–14)
NEUTROPHILS # BLD AUTO: 5.2 K/UL — SIGNIFICANT CHANGE UP (ref 1.8–7.4)
NEUTROPHILS NFR BLD AUTO: 69.4 % — SIGNIFICANT CHANGE UP (ref 43–77)
NRBC # BLD: 0 /100 WBCS — SIGNIFICANT CHANGE UP (ref 0–0)
PCP SPEC-MCNC: SIGNIFICANT CHANGE UP
PLATELET # BLD AUTO: 166 K/UL — SIGNIFICANT CHANGE UP (ref 150–400)
POTASSIUM SERPL-MCNC: 3.5 MMOL/L — SIGNIFICANT CHANGE UP (ref 3.5–5.3)
POTASSIUM SERPL-SCNC: 3.5 MMOL/L — SIGNIFICANT CHANGE UP (ref 3.5–5.3)
PROT SERPL-MCNC: 7.9 GM/DL — SIGNIFICANT CHANGE UP (ref 6–8.3)
RBC # BLD: 5.05 M/UL — SIGNIFICANT CHANGE UP (ref 3.8–5.2)
RBC # FLD: 14.4 % — SIGNIFICANT CHANGE UP (ref 10.3–14.5)
SALICYLATES SERPL-MCNC: <1.7 MG/DL — LOW (ref 2.8–20)
SODIUM SERPL-SCNC: 139 MMOL/L — SIGNIFICANT CHANGE UP (ref 135–145)
TROPONIN I SERPL-MCNC: <.015 NG/ML — SIGNIFICANT CHANGE UP (ref 0.01–0.04)
TSH SERPL-MCNC: 1.34 UIU/ML — SIGNIFICANT CHANGE UP (ref 0.36–3.74)
WBC # BLD: 7.49 K/UL — SIGNIFICANT CHANGE UP (ref 3.8–10.5)
WBC # FLD AUTO: 7.49 K/UL — SIGNIFICANT CHANGE UP (ref 3.8–10.5)

## 2021-05-04 PROCEDURE — 93010 ELECTROCARDIOGRAM REPORT: CPT

## 2021-05-04 PROCEDURE — 71046 X-RAY EXAM CHEST 2 VIEWS: CPT | Mod: 26

## 2021-05-04 PROCEDURE — 99285 EMERGENCY DEPT VISIT HI MDM: CPT

## 2021-05-04 RX ORDER — SODIUM CHLORIDE 9 MG/ML
1000 INJECTION INTRAMUSCULAR; INTRAVENOUS; SUBCUTANEOUS ONCE
Refills: 0 | Status: COMPLETED | OUTPATIENT
Start: 2021-05-04 | End: 2021-05-04

## 2021-05-04 RX ORDER — ACETAMINOPHEN 500 MG
975 TABLET ORAL ONCE
Refills: 0 | Status: COMPLETED | OUTPATIENT
Start: 2021-05-04 | End: 2021-05-04

## 2021-05-04 RX ORDER — METOCLOPRAMIDE HCL 10 MG
10 TABLET ORAL ONCE
Refills: 0 | Status: COMPLETED | OUTPATIENT
Start: 2021-05-04 | End: 2021-05-04

## 2021-05-04 RX ADMIN — Medication 975 MILLIGRAM(S): at 17:52

## 2021-05-04 RX ADMIN — Medication 10 MILLIGRAM(S): at 17:52

## 2021-05-04 RX ADMIN — SODIUM CHLORIDE 1000 MILLILITER(S): 9 INJECTION INTRAMUSCULAR; INTRAVENOUS; SUBCUTANEOUS at 17:12

## 2021-05-04 NOTE — ED PROVIDER NOTE - ATTENDING CONTRIBUTION TO CARE
I have seen the patient with the resident and agree with above examination and assessment and plan with the following addendum:        Focused PE:   General: NAD, alert and oriented  Head: Normocephalic, atraumatic  Eyes: PERRLA, EOMI  Cardiac: RRR, no murmurs, rubs or gallops  Resp: CTA, no wheezes, rales or ronchi  GI: Nondistended, nontender, no rebound or guarding  MSK: moving all extremities  Psych: Anxious, no evidence of si, hi or avh  Neuro: Alert and oriented, no focal deficits. Normal gait  Ext: Non edematous, nontender.     Ddx: Panic attack/ intoxication from mdma/ low risk for acs  Plan: Cbc, cmp, troponin, ecg, reassess

## 2021-05-04 NOTE — ED PROVIDER NOTE - OBJECTIVE STATEMENT
30F hx anxiety and asthma presents with non radiating and non exertional chest pain, palpitations, diaphoresis of palms assoc with high BP to sys 190's after taking a small amount of MDMA "oniel" at 2AM today. no f/c, sob, coughing, back pain, n/v/d/c. OF note, pt with increased stress for couple months due to arguments with , no suicidal ideation/HI. drinks alcohol sometimes but no other recreational drug use besides mdma today.

## 2021-05-04 NOTE — ED ADULT NURSE NOTE - NSIMPLEMENTINTERV_GEN_ALL_ED
Implemented All Universal Safety Interventions:  Sod to call system. Call bell, personal items and telephone within reach. Instruct patient to call for assistance. Room bathroom lighting operational. Non-slip footwear when patient is off stretcher. Physically safe environment: no spills, clutter or unnecessary equipment. Stretcher in lowest position, wheels locked, appropriate side rails in place.

## 2021-05-04 NOTE — ED ADULT NURSE NOTE - OBJECTIVE STATEMENT
30 year old female c/o of having panic attacks. Patient states tried oniel and has been nervous since she took it which was last night. Patient states has been sweating and feels weakness, "body is heavy"

## 2021-05-04 NOTE — ED PROVIDER NOTE - CLINICAL SUMMARY MEDICAL DECISION MAKING FREE TEXT BOX
30F hx anxiety and asthma presents with non radiating and non exertional chest pain, palpitations, diaphoresis of palms assoc with high BP to sys 190's after taking a small amount of MDMA "oniel" at 2AM today. Plan: Cardiac Monitor, EKG, Labs/cardiac enzymes, CXR and reass. Symptoms likely all related to methamphetamine.

## 2021-05-04 NOTE — ED PROVIDER NOTE - NS ED ROS FT
Constitutional: no fevers, no chills.  Eyes: no visual changes.  Ears: no ear drainage, no ear pain.  Nose: no nasal congestion.  Mouth/Throat: no sore throat.  Cardiovascular: +chest pain. +palpitations   Respiratory: no shortness of breath, no wheezing, no cough  Gastrointestinal: no nausea, no vomiting, no diarrhea, no abdominal pain.  MSK: no flank pain, no back pain.  Genitourinary: no dysuria, no hematuria.  Skin: no rashes.  Neuro: no headache +dizziness

## 2021-05-04 NOTE — ED PROVIDER NOTE - NSFOLLOWUPINSTRUCTIONS_ED_ALL_ED_FT
1) Please Follow up with PMD/Clinic within 2-3 days     2) Please take Tylenol 650mg every 4-6 hours as needed for pain.    3) Please return to  Emergency Department for any new or worsening symptoms.

## 2021-05-04 NOTE — ED PROVIDER NOTE - PATIENT PORTAL LINK FT
You can access the FollowMyHealth Patient Portal offered by [facility] by registering at the following website: [email or web address]. By joining [employer]’s FollowMyHealth portal, you will also be able to view your health information using other applications (apps) compatible with our system.

## 2021-05-04 NOTE — ED PROVIDER NOTE - PHYSICAL EXAMINATION
GEN: Well appearing, well nourished, in no apparent distress.  HEAD: NCAT  HEENT: PERRL, Airway patent, EOMI, no nystagmus, non-erythematous pharynx, no exudates, uvula midline, dry MM, neck supple, no LAD, no JVD  LUNG: CTAB, no adventitious sounds, no retractions, no nasal flaring  CV: tachycardic regular rhythm, no murmurs,   Abd: soft, NTND, no rebound or guarding, BS+ in all quadrants, no CVAT  MSK: WWP, Pulses 2+ in extremities, No edema   Neuro:  AAOx3, Ambulatory with stable gait. MAN without laterality, sensations intact, strength 5/5 in all extremities   Skin: Warm and dry, no evidence of rash, diaphoretic palms   Psych: normal mood and affect

## 2021-05-12 ENCOUNTER — EMERGENCY (EMERGENCY)
Facility: HOSPITAL | Age: 31
LOS: 0 days | Discharge: HOME | End: 2021-05-12
Attending: EMERGENCY MEDICINE
Payer: COMMERCIAL

## 2021-05-12 VITALS
RESPIRATION RATE: 18 BRPM | SYSTOLIC BLOOD PRESSURE: 137 MMHG | TEMPERATURE: 98 F | OXYGEN SATURATION: 98 % | WEIGHT: 210.1 LBS | DIASTOLIC BLOOD PRESSURE: 86 MMHG | HEIGHT: 66 IN | HEART RATE: 109 BPM

## 2021-05-12 VITALS — HEART RATE: 90 BPM

## 2021-05-12 DIAGNOSIS — F41.9 ANXIETY DISORDER, UNSPECIFIED: ICD-10-CM

## 2021-05-12 DIAGNOSIS — K21.9 GASTRO-ESOPHAGEAL REFLUX DISEASE WITHOUT ESOPHAGITIS: ICD-10-CM

## 2021-05-12 DIAGNOSIS — J45.909 UNSPECIFIED ASTHMA, UNCOMPLICATED: ICD-10-CM

## 2021-05-12 DIAGNOSIS — Z79.52 LONG TERM (CURRENT) USE OF SYSTEMIC STEROIDS: ICD-10-CM

## 2021-05-12 PROCEDURE — 93010 ELECTROCARDIOGRAM REPORT: CPT

## 2021-05-12 PROCEDURE — 99284 EMERGENCY DEPT VISIT MOD MDM: CPT

## 2021-05-12 RX ORDER — ACETAMINOPHEN 500 MG
650 TABLET ORAL ONCE
Refills: 0 | Status: COMPLETED | OUTPATIENT
Start: 2021-05-12 | End: 2021-05-12

## 2021-05-12 RX ORDER — FAMOTIDINE 10 MG/ML
20 INJECTION INTRAVENOUS ONCE
Refills: 0 | Status: COMPLETED | OUTPATIENT
Start: 2021-05-12 | End: 2021-05-12

## 2021-05-12 RX ORDER — FAMOTIDINE 10 MG/ML
1 INJECTION INTRAVENOUS
Qty: 20 | Refills: 0
Start: 2021-05-12 | End: 2021-05-21

## 2021-05-12 RX ORDER — ALPRAZOLAM 0.25 MG
0.25 TABLET ORAL ONCE
Refills: 0 | Status: DISCONTINUED | OUTPATIENT
Start: 2021-05-12 | End: 2021-05-12

## 2021-05-12 RX ORDER — ALPRAZOLAM 0.25 MG
1 TABLET ORAL
Qty: 6 | Refills: 0
Start: 2021-05-12 | End: 2021-05-13

## 2021-05-12 RX ADMIN — Medication 30 MILLILITER(S): at 15:18

## 2021-05-12 RX ADMIN — FAMOTIDINE 20 MILLIGRAM(S): 10 INJECTION INTRAVENOUS at 15:18

## 2021-05-12 RX ADMIN — Medication 650 MILLIGRAM(S): at 15:18

## 2021-05-12 RX ADMIN — Medication 0.25 MILLIGRAM(S): at 15:18

## 2021-05-12 NOTE — ED ADULT NURSE REASSESSMENT NOTE - NS ED NURSE REASSESS COMMENT FT1
Pt reports racing heart rate, GERD like symptoms, headache, dizziness, and generalized weakness. Refusing any blood work at this time. PM Anxiety, panic attacks, asthma

## 2021-05-12 NOTE — ED PROVIDER NOTE - OBJECTIVE STATEMENT
30 year old female w/PMH of GERD, anxiety, asthma presents to the ED for episode of anxiety/panic attack. Pt states she had just started taking B12 pills recently and started feeling some discomfort and then began breathing really fast and had a panic episode. Pt reports she has had x3 very similar episodes in the past year, states nothing different about this episode. Currently feeling better but would still like medications to help with her anxiety. Had blood work done in past week by PMD, would not like any further blood work or testing done but wants to be treated for her heartburn and anxiety she is currently experience. When pt is feeling better she would like to go home with no further testing.

## 2021-05-12 NOTE — SBIRT NOTE ADULT - NSSBIRTALCPOSREINDET_GEN_A_CORE
Provided SBIRT services: Full screen Negative. Positive reinforcement provided given patient currently within healthy guidelines. Education materials reviewed and given to patient.    Pt reports no use of tobacco at this time.

## 2021-05-12 NOTE — ED ADULT TRIAGE NOTE - CHIEF COMPLAINT QUOTE
as per emt, pt c/o anxiety, fatigue, difficulty sleeping, intermittent chest tightness x 1 week. pt received vitamin B12 from pmd today.

## 2021-06-22 NOTE — ED ADULT NURSE NOTE - NS ED NURSE LEVEL OF CONSCIOUSNESS AFFECT
Pt scheduled appt on 7/16/21  Can't come in sooner due to  having surgery for aneurysm  Pls fill medications to pharmacy and also wants bw ordered prior to visit  Pls call when ready  Calm

## 2021-08-05 ENCOUNTER — EMERGENCY (EMERGENCY)
Facility: HOSPITAL | Age: 31
LOS: 1 days | Discharge: HOME | End: 2021-08-05
Attending: EMERGENCY MEDICINE | Admitting: PERSONAL EMERGENCY RESPONSE ATTENDANT
Payer: MEDICAID

## 2021-08-05 VITALS
DIASTOLIC BLOOD PRESSURE: 86 MMHG | OXYGEN SATURATION: 97 % | HEART RATE: 96 BPM | RESPIRATION RATE: 16 BRPM | TEMPERATURE: 98 F | SYSTOLIC BLOOD PRESSURE: 134 MMHG | HEIGHT: 64 IN

## 2021-08-05 VITALS
DIASTOLIC BLOOD PRESSURE: 83 MMHG | OXYGEN SATURATION: 99 % | HEART RATE: 91 BPM | TEMPERATURE: 98 F | RESPIRATION RATE: 18 BRPM | SYSTOLIC BLOOD PRESSURE: 130 MMHG

## 2021-08-05 PROBLEM — J45.909 UNSPECIFIED ASTHMA, UNCOMPLICATED: Chronic | Status: ACTIVE | Noted: 2021-01-19

## 2021-08-05 LAB
ALBUMIN SERPL ELPH-MCNC: 4.3 G/DL — SIGNIFICANT CHANGE UP (ref 3.3–5)
ALP SERPL-CCNC: 63 U/L — SIGNIFICANT CHANGE UP (ref 40–120)
ALT FLD-CCNC: 27 U/L — SIGNIFICANT CHANGE UP (ref 4–33)
ANION GAP SERPL CALC-SCNC: 17 MMOL/L — HIGH (ref 7–14)
AST SERPL-CCNC: 36 U/L — HIGH (ref 4–32)
BASOPHILS # BLD AUTO: 0.03 K/UL — SIGNIFICANT CHANGE UP (ref 0–0.2)
BASOPHILS NFR BLD AUTO: 0.3 % — SIGNIFICANT CHANGE UP (ref 0–2)
BILIRUB SERPL-MCNC: 0.4 MG/DL — SIGNIFICANT CHANGE UP (ref 0.2–1.2)
BUN SERPL-MCNC: 8 MG/DL — SIGNIFICANT CHANGE UP (ref 7–23)
CALCIUM SERPL-MCNC: 9.8 MG/DL — SIGNIFICANT CHANGE UP (ref 8.4–10.5)
CHLORIDE SERPL-SCNC: 101 MMOL/L — SIGNIFICANT CHANGE UP (ref 98–107)
CO2 SERPL-SCNC: 17 MMOL/L — LOW (ref 22–31)
CREAT SERPL-MCNC: 0.49 MG/DL — LOW (ref 0.5–1.3)
EOSINOPHIL # BLD AUTO: 0.07 K/UL — SIGNIFICANT CHANGE UP (ref 0–0.5)
EOSINOPHIL NFR BLD AUTO: 0.6 % — SIGNIFICANT CHANGE UP (ref 0–6)
GLUCOSE SERPL-MCNC: 91 MG/DL — SIGNIFICANT CHANGE UP (ref 70–99)
HCT VFR BLD CALC: 42.7 % — SIGNIFICANT CHANGE UP (ref 34.5–45)
HGB BLD-MCNC: 13.2 G/DL — SIGNIFICANT CHANGE UP (ref 11.5–15.5)
IANC: 8.1 K/UL — SIGNIFICANT CHANGE UP (ref 1.5–8.5)
IMM GRANULOCYTES NFR BLD AUTO: 0.3 % — SIGNIFICANT CHANGE UP (ref 0–1.5)
LYMPHOCYTES # BLD AUTO: 18.4 % — SIGNIFICANT CHANGE UP (ref 13–44)
LYMPHOCYTES # BLD AUTO: 2.04 K/UL — SIGNIFICANT CHANGE UP (ref 1–3.3)
MAGNESIUM SERPL-MCNC: 2.2 MG/DL — SIGNIFICANT CHANGE UP (ref 1.6–2.6)
MCHC RBC-ENTMCNC: 27.2 PG — SIGNIFICANT CHANGE UP (ref 27–34)
MCHC RBC-ENTMCNC: 30.9 GM/DL — LOW (ref 32–36)
MCV RBC AUTO: 87.9 FL — SIGNIFICANT CHANGE UP (ref 80–100)
MONOCYTES # BLD AUTO: 0.79 K/UL — SIGNIFICANT CHANGE UP (ref 0–0.9)
MONOCYTES NFR BLD AUTO: 7.1 % — SIGNIFICANT CHANGE UP (ref 2–14)
NEUTROPHILS # BLD AUTO: 8.1 K/UL — HIGH (ref 1.8–7.4)
NEUTROPHILS NFR BLD AUTO: 73.3 % — SIGNIFICANT CHANGE UP (ref 43–77)
NRBC # BLD: 0 /100 WBCS — SIGNIFICANT CHANGE UP
NRBC # FLD: 0 K/UL — SIGNIFICANT CHANGE UP
PHOSPHATE SERPL-MCNC: 3.4 MG/DL — SIGNIFICANT CHANGE UP (ref 2.5–4.5)
PLATELET # BLD AUTO: 200 K/UL — SIGNIFICANT CHANGE UP (ref 150–400)
POTASSIUM SERPL-MCNC: 4.9 MMOL/L — SIGNIFICANT CHANGE UP (ref 3.5–5.3)
POTASSIUM SERPL-SCNC: 4.9 MMOL/L — SIGNIFICANT CHANGE UP (ref 3.5–5.3)
PROT SERPL-MCNC: 8.1 G/DL — SIGNIFICANT CHANGE UP (ref 6–8.3)
RBC # BLD: 4.86 M/UL — SIGNIFICANT CHANGE UP (ref 3.8–5.2)
RBC # FLD: 13.8 % — SIGNIFICANT CHANGE UP (ref 10.3–14.5)
SODIUM SERPL-SCNC: 135 MMOL/L — SIGNIFICANT CHANGE UP (ref 135–145)
WBC # BLD: 11.06 K/UL — HIGH (ref 3.8–10.5)
WBC # FLD AUTO: 11.06 K/UL — HIGH (ref 3.8–10.5)

## 2021-08-05 PROCEDURE — 99284 EMERGENCY DEPT VISIT MOD MDM: CPT | Mod: 25

## 2021-08-05 PROCEDURE — 93010 ELECTROCARDIOGRAM REPORT: CPT

## 2021-08-05 RX ORDER — ALPRAZOLAM 0.25 MG
0.5 TABLET ORAL ONCE
Refills: 0 | Status: DISCONTINUED | OUTPATIENT
Start: 2021-08-05 | End: 2021-08-05

## 2021-08-05 RX ADMIN — Medication 0.5 MILLIGRAM(S): at 16:34

## 2021-08-05 NOTE — ED PROVIDER NOTE - PROGRESS NOTE DETAILS
pt pending repeat cbc, labs states sample clotted. will sign out to night team Attending MD Reyes.  Pt signed out to me in stable condition pending CBC, d/c, Panic attack, takes xanax at home, nausea, 1 episode vomiting today, d/c after CBC results, has xanax at home, denies SI, appt with sugar for f/u already est. Bert Smallwood MD resident: Pt was made aware of her blood results and is comfortable. Pt stable for discharge. Pt already has psych appointment scheduled for f/u.

## 2021-08-05 NOTE — ED PROVIDER NOTE - CLINICAL SUMMARY MEDICAL DECISION MAKING FREE TEXT BOX
Pt is a 29 y/o female c/o of 10 day history panic attack symptoms. The episodes of her symptoms began at the same time the patient decided to discontinue her anxiety medication. Pt's symptoms are likely due to her panic attacks, but will screen for cardiac issues with EKG and perform blood work/urine preg to rule out pregnancy, anemia, and electrolyte abnormalities.

## 2021-08-05 NOTE — ED ADULT NURSE NOTE - OBJECTIVE STATEMENT
29y/o female A&ox4, ambulatory received in intake8. pt c/o anxiety attack, states medication she takes daily is making her feel worse. denies chest pain, sob, palpitations, dizziness, lightheadedness, headache. respirations even and unlabored. pt comfortable in bed. md at bedside for eval. labs drawn and sent. pt in NAD. bed in lowest position, side rails up, call bell in hand, safety maintained. awaiting further orders. will continue to monitor.

## 2021-08-05 NOTE — ED PROVIDER NOTE - PHYSICAL EXAMINATION
GENERAL: well appearing in no acute distress, non-toxic appearing  HEAD: normocephalic, atraumatic  HEENT: normal conjunctiva, oral mucosa moist, uvula midline, no tonsilar exudates, neck supple, no JVD  CARDIAC: regular rate and rhythm, normal S1S2, no appreciable murmurs, 2+ pulses in UE/LE b/l  PULM: normal breath sounds, clear to ascultation bilaterally, no rales, rhonchi, wheezing  GI: +Diffuse tenderness across the abdomen, abdomen nondistended, soft, no guarding, rebound tenderness  : no CVA tenderness b/l, no suprapubic tenderness  NEURO: no focal motor or sensory deficits, CN2-12 intact, normal speech, PERRLA, EOMI, AAOx3  MSK: no peripheral edema, no calf tenderness b/l  SKIN: well-perfused, extremities warm, no visible rashes  PSYCH: appropriate mood and affect GENERAL: well appearing in no acute distress, non-toxic appearing  HEAD: normocephalic, atraumatic  HEENT: normal conjunctiva, oral mucosa moist, uvula midline, no tonsilar exudates, neck supple, no JVD  CARDIAC: regular rate and rhythm, normal S1S2, no appreciable murmurs, 2+ pulses in UE/LE b/l  PULM: normal breath sounds, clear to ascultation bilaterally, no rales, rhonchi, wheezing  GI: +Diffuse tenderness across the abdomen. Pt is easily distracted, and does not grimace or react to abdominal palpation until specifically asked about it. Abdomen nondistended, soft, no guarding, rebound tenderness  : no CVA tenderness b/l, no suprapubic tenderness  NEURO: no focal motor or sensory deficits, CN2-12 intact, normal speech, PERRLA, EOMI, AAOx3  MSK: no peripheral edema, no calf tenderness b/l  SKIN: well-perfused, extremities warm, no visible rashes  PSYCH: appropriate mood and affect

## 2021-08-05 NOTE — ED ADULT TRIAGE NOTE - CHIEF COMPLAINT QUOTE
Patient h/o panic attacks c/o feeling uneasy, weak and dizzy and " I am panicking more". x a week.   by ems

## 2021-08-05 NOTE — ED PROVIDER NOTE - OBJECTIVE STATEMENT
Pt is a 31 y/o female c/o of 10 day history of episodes of weakness, palpitations, and sweaty palms and feet. Pt states that the feelings are consistent with her panic attacks. 10 days ago, pt switched from alprazolam 0.5mg under the tongue to the same medication, but PO. Pt took 1 tablet PO 10 days ago and is scared of the side effects of the medication. Pt admits to an episode of vomiting this morning. Denies vision changes, paresthesias, chest pain, SOB, abdominal pain, diarrhea/constipation.

## 2021-08-05 NOTE — ED PROVIDER NOTE - PATIENT PORTAL LINK FT
You can access the FollowMyHealth Patient Portal offered by [facility] by registering at the following website: [email or web address]. By joining [employer]’s FollowMyHealth portal, you will also be able to view your health information using other applications (apps) compatible with our system.

## 2021-08-05 NOTE — ED PROVIDER NOTE - NS ED ROS FT
CONSTITUTIONAL: No fever, weight loss, or fatigue  EYES: No eye pain, visual disturbances, or discharge  ENMT:  +Vertigo, No difficulty hearing, tinnitus; No sinus or throat pain  NECK: No pain or stiffness  RESPIRATORY: No cough, wheezing, chills or hemoptysis; No shortness of breath  CARDIOVASCULAR: No chest pain, palpitations, dizziness, or leg swelling  GASTROINTESTINAL: No abdominal or epigastric pain. +Nausea, +Vomiting, no hematemesis; No diarrhea or constipation. No melena or hematochezia.  GENITOURINARY: No dysuria, frequency, hematuria, or incontinence  NEUROLOGICAL: No headaches, memory loss, loss of strength, numbness, or tremors  SKIN: No itching, burning, rashes, or lesions

## 2021-08-05 NOTE — ED PROVIDER NOTE - ATTENDING CONTRIBUTION TO CARE
Attending Statement: I have personally seen and examined this patient. I have fully participated in the care of this patient. I have reviewed all pertinent clinical information, including history physical exam, plan and the Resident's note and agree except as noted  29yo F hx of anxiety  pw anxiety attacks. States she feels "very nervous" "hands are sweaty and cant breath" no chest pain. +nausea and one episode of NB vomit today. no fever. no cough. Taking alprazolam OTC changed to PO, states "its not working" No SI or HI.   Vital signs noted. mmm. Sitting up anxious not toxic. no jaundice. nonicteric. normal S1-S2 No resp distress. able to speak in full and clear sentences. no wheeze, rales or stridor. soft nontender abdomen. no  rebound. no guarding. no sign of trauma. no CVAT   plan labs, po med, re assess   pt has an apt the psychiatrist next week .

## 2021-08-05 NOTE — ED PROVIDER NOTE - NSFOLLOWUPINSTRUCTIONS_ED_ALL_ED_FT
Palpitations    A palpitation is the feeling that your heartbeat is irregular or is faster than normal. It may feel like your heart is fluttering or skipping a beat. They may be caused by many things, including smoking, caffeine, alcohol, stress, and certain medicines. Although most causes of palpitations are not serious, palpitations can be a sign of a serious medical problem. Avoid caffeine, alcohol, and tobacco products at home. Try to reduce stress and anxiety and make sure to get plenty of rest.     SEEK IMMEDIATE MEDICAL CARE IF YOU HAVE ANY OF THE FOLLOWING SYMPTOMS: chest pain, shortness of breath, severe headache, dizziness/lightheadedness, or fainting.     The results of any blood tests and imaging studies completed during your visit today were discussed and explained to you and a copy provided with your discharge instructions. Please follow up with your primary care doctor within 48 hours. Palpitations    A palpitation is the feeling that your heartbeat is irregular or is faster than normal. It may feel like your heart is fluttering or skipping a beat. They may be caused by many things, including smoking, caffeine, alcohol, stress, and certain medicines. Although most causes of palpitations are not serious, palpitations can be a sign of a serious medical problem. Avoid caffeine, alcohol, and tobacco products at home. Try to reduce stress and anxiety and make sure to get plenty of rest.     SEEK IMMEDIATE MEDICAL CARE IF YOU HAVE ANY OF THE FOLLOWING SYMPTOMS: chest pain, shortness of breath, severe headache, dizziness/lightheadedness, or fainting.     The results of any blood tests and imaging studies completed during your visit today were discussed and explained to you and a copy provided with your discharge instructions. Please follow up with your primary care doctor within 48 hours. Please follow up with your psychiatrist.

## 2022-01-13 NOTE — ED ADULT TRIAGE NOTE - NS ED TRIAGE AVPU SCALE
Alert-The patient is alert, awake and responds to voice. The patient is oriented to time, place, and person. The triage nurse is able to obtain subjective information.
Assistance with ambulation/Assistance OOB with selected safe patient handling equipment/Communicate Risk of Fall with Harm to all staff/Reinforce activity limits and safety measures with patient and family/Tailored Fall Risk Interventions/Visual Cue: Yellow wristband and red socks/Bed in lowest position, wheels locked, appropriate side rails in place/Call bell, personal items and telephone in reach/Instruct patient to call for assistance before getting out of bed or chair/Non-slip footwear when patient is out of bed/[place] to call system/Physically safe environment - no spills, clutter or unnecessary equipment/Purposeful Proactive Rounding/Room/bathroom lighting operational, light cord in reach

## 2022-09-06 RX ORDER — ASPIRIN/CALCIUM CARB/MAGNESIUM 324 MG
1 TABLET ORAL
Qty: 0 | Refills: 0 | DISCHARGE

## 2022-09-06 RX ORDER — CHOLECALCIFEROL (VITAMIN D3) 125 MCG
1 CAPSULE ORAL
Qty: 0 | Refills: 0 | DISCHARGE

## 2022-12-08 ENCOUNTER — EMERGENCY (EMERGENCY)
Facility: HOSPITAL | Age: 32
LOS: 1 days | Discharge: HOME | End: 2022-12-08
Attending: STUDENT IN AN ORGANIZED HEALTH CARE EDUCATION/TRAINING PROGRAM

## 2022-12-08 VITALS
HEIGHT: 64 IN | WEIGHT: 225.09 LBS | DIASTOLIC BLOOD PRESSURE: 80 MMHG | TEMPERATURE: 98 F | SYSTOLIC BLOOD PRESSURE: 147 MMHG | OXYGEN SATURATION: 99 % | HEART RATE: 79 BPM | RESPIRATION RATE: 19 BRPM

## 2022-12-08 VITALS
SYSTOLIC BLOOD PRESSURE: 138 MMHG | RESPIRATION RATE: 20 BRPM | DIASTOLIC BLOOD PRESSURE: 92 MMHG | TEMPERATURE: 99 F | HEART RATE: 85 BPM | OXYGEN SATURATION: 99 %

## 2022-12-08 DIAGNOSIS — M79.642 PAIN IN LEFT HAND: ICD-10-CM

## 2022-12-08 DIAGNOSIS — Z88.8 ALLERGY STATUS TO OTHER DRUGS, MEDICAMENTS AND BIOLOGICAL SUBSTANCES STATUS: ICD-10-CM
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
## 2022-12-08 DIAGNOSIS — J45.909 UNSPECIFIED ASTHMA, UNCOMPLICATED: ICD-10-CM
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
## 2022-12-08 PROCEDURE — 73120 X-RAY EXAM OF HAND: CPT | Mod: 26,LT

## 2022-12-08 PROCEDURE — 99284 EMERGENCY DEPT VISIT MOD MDM: CPT | Mod: 25

## 2022-12-08 PROCEDURE — 29125 APPL SHORT ARM SPLINT STATIC: CPT | Mod: LT

## 2022-12-08 RX ORDER — ACETAMINOPHEN 500 MG
650 TABLET ORAL ONCE
Refills: 0 | Status: COMPLETED | OUTPATIENT
Start: 2022-12-08 | End: 2022-12-08

## 2022-12-08 RX ORDER — KETOROLAC TROMETHAMINE 30 MG/ML
30 SYRINGE (ML) INJECTION ONCE
Refills: 0 | Status: DISCONTINUED | OUTPATIENT
Start: 2022-12-08 | End: 2022-12-08

## 2022-12-08 RX ADMIN — Medication 650 MILLIGRAM(S): at 14:11

## 2022-12-08 RX ADMIN — Medication 30 MILLIGRAM(S): at 16:22

## 2022-12-08 NOTE — ED PROVIDER NOTE - CARE PROVIDER_API CALL
Bailee Donovan (MD)  Plastic Surgery  [address] [place] [address], [address]  [place], [place] [id number]  Phone: [phone]  Fax: [phone]  Follow Up Time: 4-6 Days

## 2022-12-08 NOTE — ED PROVIDER NOTE - OBJECTIVE STATEMENT
31F with asthma presents for left hand pain. She was donating clothes into a bin and the lid slammed on her left hand. She now reports pain that is radiating up her arm. No other injury.

## 2022-12-08 NOTE — ED ADULT NURSE NOTE - OBJECTIVE STATEMENT
patient is alert and oriented x4, came in for left hand pain after donation bin cover hit her left hand. has difficulty moving left hand. appears red, minimal swelling seen.

## 2022-12-08 NOTE — ED PROVIDER NOTE - PATIENT PORTAL LINK FT
You can access the FollowMyHealth Patient Portal offered by [facility] by registering at the following website: [email or web address]. By joining [employer]’s FollowMyHealth portal, you will also be able to view your health information using other applications (apps) compatible with our system.

## 2022-12-08 NOTE — ED PROCEDURE NOTE - ATTENDING CONTRIBUTION TO CARE
I, Sandra Segal, performed a history and physical exam of the patient and discussed their management with the resident, student, and/or fellow. I reviewed the note and agree with the documented findings and plan of care. I was present and available for all procedures.

## 2022-12-08 NOTE — ED PROVIDER NOTE - NSFOLLOWUPINSTRUCTIONS_ED_ALL_ED_FT
- Take 2 tablets of tylenol 500mg extra strength every 6 to 8 hours along with 200 or 400mg of motrin for the pain.   - Keep the splint dry for 24 hours and avoid wet when showering  - Below is the information for the hand specialist/bone doctor who you can call for an appointment in 1 week for repeat XR

## 2022-12-08 NOTE — ED ADULT TRIAGE NOTE - CHIEF COMPLAINT QUOTE
Patient c/o pain to left hand after steel donation bin hit her hand. Patient's hand appears swollen, red and tender to touch. LMP 12/6/22  hx asthma, anxiety

## 2022-12-08 NOTE — ED PROVIDER NOTE - CLINICAL SUMMARY MEDICAL DECISION MAKING FREE TEXT BOX
Patient presenting for left hand pain after injury. Patient has diffuse erythema and tenderness of the volar aspect of the left thumb. Possible snuffbox tenderness but patient has diffuse pain therefore plan to evaluate for fx with XR, will give pain meds and will splint with f/u with hand specialist.

## 2022-12-08 NOTE — ED ADULT NURSE NOTE - NSICDXPASTMEDICALHX_GEN_ALL_CORE_FT
PAST MEDICAL HISTORY:  Asthma     Asthma     No pertinent past medical history     No pertinent past medical history

## 2022-12-08 NOTE — ED PROVIDER NOTE - PHYSICAL EXAMINATION
Const: Cooperative, in NAD  HEENT: Head is normocephalic, atraumatic. PERRLA. EOMI. Sclera and conjunctiva normal  MSK: Mild tenderness and swelling over the left thumb in the volar aspect. NV intact. No obvious    Neuro:Awake, AOx3, follows commands Const: Cooperative, in NAD  HEENT: Head is normocephalic, atraumatic. PERRLA. EOMI. Sclera and conjunctiva normal  MSK: Mild tenderness and swelling over the left thumb in the volar aspect. NV intact. No obvious  deformity          (-)snuffbox tenderness. (-) tenderness of scaphoid tubercle   Neuro:Awake, AOx3, follows commands Const: Cooperative, in NAD  HEENT: Head is normocephalic, atraumatic. PERRLA. EOMI. Sclera and conjunctiva normal  MSK: Mild tenderness and swelling over the left thumb in the volar aspect. NV intact. No obvious  deformity          hard to determine if +snuffbox tenderness due to diffuse tenderness  Neuro:Awake, AOx3, follows commands

## 2023-03-11 NOTE — SBIRT NOTE ADULT - NSSBIRTSCREENAVAIL_GEN_A_CORE
Pt. with anemia in the setting of ESRD. Hgb below target range at 9.1 today. Discussed with [facility], he is on Aranesp 80mcg weekly (last dose on 3/7/23). Will continue weekly Aranesp. Target range of 10-11. Monitor Hgb. Yes

## 2023-08-21 NOTE — ED PROVIDER NOTE - CARE PLAN
Anesthesia Evaluation     Patient summary reviewed and Nursing notes reviewed                Airway   Mallampati: II  TM distance: >3 FB  Neck ROM: full  Dental - normal exam     Pulmonary - negative pulmonary ROS   Cardiovascular     (+) hyperlipidemia      Neuro/Psych- negative ROS  GI/Hepatic/Renal/Endo    (+) GI bleeding lower     Musculoskeletal     Abdominal    Substance History      OB/GYN          Other                      Anesthesia Plan    ASA 2     MAC   total IV anesthesia  (I have reviewed the patient's history with the patient and the chart, including all pertinent laboratory results and imaging. I have explained the risks of anesthesia including but not limited to dental damage, corneal abrasion, nerve injury, MI, stroke, and death. Questions asked and answered. Anesthetic plan discussed with patient and team as indicated. Patient expressed understanding of the above.  )    Anesthetic plan, risks, benefits, and alternatives have been provided, discussed and informed consent has been obtained with: patient.    CODE STATUS:          Principal Discharge DX:	Anxiety  Secondary Diagnosis:	GERD (gastroesophageal reflux disease)

## 2023-09-15 NOTE — ED ADULT TRIAGE NOTE - WEIGHT IN KG
95.3 The resident's documentation has been prepared under my direction and personally reviewed by me in its entirety. I confirm that the note above accurately reflects all work, treatment, procedures, and medical decision making performed by me. Please see STONE Casillas MD PEM Attending

## 2024-05-21 NOTE — ED ADULT TRIAGE NOTE - PAIN: PRESENCE, MLM
[Cooperative] : cooperative [Euthymic] : euthymic [Full] : full [Clear] : clear [Linear/Goal Directed] : linear/goal directed [Hallucinations - Auditory] : hallucinations - auditory [Average] : average [WNL] : within normal limits [FreeTextEntry8] : Improved [de-identified] : Pt is aware these are hallucinations and she reports she is able to see them as such complains of pain/discomfort

## 2024-06-04 NOTE — ED ADULT NURSE NOTE - NSICDXFAMILYHX_GEN_ALL_CORE_FT
How Severe Are Your Spot(S)?: mild
Have Your Spot(S) Been Treated In The Past?: has not been treated
Hpi Title: Evaluation of Skin Lesions
FAMILY HISTORY:  No pertinent family history in first degree relatives

## 2024-06-24 NOTE — ED PROVIDER NOTE - IV ALTEPLASE INCLUSION HIDDEN
show Patient with multiple maculopapular skin lesions concerning for possible money pox.    - Follow up money pox CPR

## 2024-07-09 NOTE — PROGRESS NOTE ADULT - PROBLEM SELECTOR PLAN 4
supine -  likely from covid/asthma exacerbation  - trop<6  - pain control PRN w/ Tylenol  - f/u TTE  - EKG: Sinus Tachycardia@104 qtc 447  - Chest Xray: IMPRESSION: Bilateral peripheral opacities consistent with covid 19 multifocal pneumonia. -  likely from covid/asthma exacerbation  - trop<6  - pain control PRN w/ Tylenol  - EKG: Sinus Tachycardia@104 qtc 447  - Chest Xray: IMPRESSION: Bilateral peripheral opacities consistent with covid 19 multifocal pneumonia.

## 2025-07-07 NOTE — DISCHARGE NOTE NURSING/CASE MANAGEMENT/SOCIAL WORK - NSFLUVACAGEDISCH_IMM_ALL_CORE
Attend and participate in at least 2 groups daily despite low mood/energy Adult Attend and participate in at least 2 groups daily despite low mood/energy Attend and participate in at least 2 groups daily despite low mood/energy Attend and participate in at least 2 groups daily despite low mood/energy Attend and participate in at least 2 groups daily despite low mood/energy